# Patient Record
Sex: FEMALE | Race: WHITE | URBAN - METROPOLITAN AREA
[De-identification: names, ages, dates, MRNs, and addresses within clinical notes are randomized per-mention and may not be internally consistent; named-entity substitution may affect disease eponyms.]

---

## 2020-07-23 ENCOUNTER — WEB ENCOUNTER (OUTPATIENT)
Dept: URBAN - METROPOLITAN AREA CLINIC 54 | Facility: CLINIC | Age: 65
End: 2020-07-23

## 2020-07-23 ENCOUNTER — OFFICE VISIT (OUTPATIENT)
Dept: URBAN - METROPOLITAN AREA CLINIC 54 | Facility: CLINIC | Age: 65
End: 2020-07-23
Payer: COMMERCIAL

## 2020-07-23 DIAGNOSIS — I86.8 CAPUT MEDUSAE: ICD-10-CM

## 2020-07-23 DIAGNOSIS — R19.4 CHANGE IN BOWEL FUNCTION: ICD-10-CM

## 2020-07-23 DIAGNOSIS — K63.5 COLON POLYPS: ICD-10-CM

## 2020-07-23 DIAGNOSIS — R19.5 MUCOUS IN STOOLS: ICD-10-CM

## 2020-07-23 DIAGNOSIS — K57.92 DIVERTICULITIS: ICD-10-CM

## 2020-07-23 DIAGNOSIS — J44.9 COPD (CHRONIC OBSTRUCTIVE PULMONARY DISEASE): ICD-10-CM

## 2020-07-23 PROCEDURE — 3017F COLORECTAL CA SCREEN DOC REV: CPT | Performed by: INTERNAL MEDICINE

## 2020-07-23 PROCEDURE — G9903 PT SCRN TBCO ID AS NON USER: HCPCS | Performed by: INTERNAL MEDICINE

## 2020-07-23 PROCEDURE — 1036F TOBACCO NON-USER: CPT | Performed by: INTERNAL MEDICINE

## 2020-07-23 PROCEDURE — 99214 OFFICE O/P EST MOD 30 MIN: CPT | Performed by: INTERNAL MEDICINE

## 2020-07-23 PROCEDURE — G8427 DOCREV CUR MEDS BY ELIG CLIN: HCPCS | Performed by: INTERNAL MEDICINE

## 2020-07-23 RX ORDER — MONTELUKAST SODIUM 10 MG/1
1 TABLET TABLET ORAL ONCE A DAY
Status: ACTIVE | COMMUNITY

## 2020-07-23 RX ORDER — FLUTICASONE PROPIONATE AND SALMETEROL XINAFOATE 230; 21 UG/1; UG/1
INHALE 2 PUFFS BY INHALATION ROUTE 2 TIMES PER DAY IN THE MORNING AND EVENING AEROSOL, METERED RESPIRATORY (INHALATION) 2
Qty: 1 | Refills: 0 | Status: ACTIVE | COMMUNITY
Start: 1900-01-01

## 2020-07-23 RX ORDER — TORSEMIDE 10 MG/1
1 TABLET TABLET ORAL ONCE A DAY
Status: ACTIVE | COMMUNITY

## 2020-07-23 RX ORDER — PREDNISONE 10 MG/1
TAKE 1 MG/KG BY ORAL ROUTE ONCE DAILY TABLET ORAL 1
Qty: 0 | Refills: 0 | Status: ACTIVE | COMMUNITY
Start: 1900-01-01

## 2020-07-23 RX ORDER — VARENICLINE TARTRATE 1 MG/1
AS DIRECTED TABLET, FILM COATED ORAL
Status: ACTIVE | COMMUNITY

## 2020-07-23 RX ORDER — SODIUM, POTASSIUM,MAG SULFATES 17.5-3.13G
177 ML SOLUTION, RECONSTITUTED, ORAL ORAL
Qty: 1 | Refills: 0 | OUTPATIENT
Start: 2020-07-23 | End: 2020-07-24

## 2020-07-23 RX ORDER — LEVOCETIRIZINE DIHYDROCHLORIDE 5 MG/1
TAKE 1 TABLET (5 MG) BY ORAL ROUTE ONCE DAILY IN THE EVENING TABLET ORAL 1
Qty: 0 | Refills: 0 | Status: DISCONTINUED | COMMUNITY
Start: 1900-01-01

## 2020-07-23 RX ORDER — SERTRALINE 100 MG/1
TAKE 1.5 TABLETS (150 MG) BY ORAL ROUTE ONCE DAILY TABLET, FILM COATED ORAL 1
Qty: 0 | Refills: 0 | Status: ACTIVE | COMMUNITY
Start: 1900-01-01

## 2020-07-23 RX ORDER — PANTOPRAZOLE SODIUM 40 MG/1
1 TABLET TABLET, DELAYED RELEASE ORAL ONCE A DAY
Status: ACTIVE | COMMUNITY

## 2020-07-23 RX ORDER — MULTIVIT-MIN/IRON/FOLIC ACID/K 18-600-40
AS DIRECTED CAPSULE ORAL
Status: ACTIVE | COMMUNITY

## 2020-07-23 RX ORDER — AZITHROMYCIN 250 MG/1
M, W, F TABLET, FILM COATED ORAL
Qty: 0 | Refills: 0 | Status: ACTIVE | COMMUNITY
Start: 1900-01-01

## 2020-07-23 RX ORDER — TIOTROPIUM BROMIDE 18 UG/1
INHALE 1 CAPSULE (18 MCG) USING 2 INHALATIONS BY INHALATION ROUTE ONCE DAILY VIA HANDIHALER CAPSULE ORAL; RESPIRATORY (INHALATION) 1
Qty: 1 | Refills: 0 | Status: ACTIVE | COMMUNITY
Start: 1900-01-01

## 2020-07-23 RX ORDER — ROFLUMILAST 500 UG/1
1 TABLET TABLET ORAL ONCE A DAY
Status: ACTIVE | COMMUNITY

## 2020-07-23 RX ORDER — IBUPROFEN 200 MG
1 TABLET WITH MEALS CAPSULE ORAL TWICE A DAY
Status: ACTIVE | COMMUNITY

## 2020-07-23 RX ORDER — LEVOCETIRIZINE DIHYDROCHLORIDE 5 MG/1
1 TABLET IN THE EVENING TABLET, FILM COATED ORAL ONCE A DAY
Status: ACTIVE | COMMUNITY

## 2020-07-23 RX ORDER — ALPRAZOLAM 1 MG
TAKE 1 TABLET (1 MG) BY ORAL ROUTE 3 TIMES PER DAY TABLET ORAL
Qty: 0 | Refills: 0 | Status: ACTIVE | COMMUNITY
Start: 1900-01-01

## 2020-07-23 NOTE — PHYSICAL EXAM GASTROINTESTINAL
Abdomen , soft, distended , no guarding or rigidity , no masses palpable , normal bowel sounds , Liver and Spleen , no hepatomegaly present , no hepatosplenomegaly , liver nontender , spleen not palpable +caput and striae

## 2020-07-23 NOTE — HPI-TODAY'S VISIT:
Patient reports that she has been having trouble with changes inbowel habits alternating bowel movement. Pencil like stools at times Feeling of incomplete emptying  Pt with increased abdominal girth as well with asymmetry on the rt side. Recent dx of bladder ca.

## 2020-07-23 NOTE — PHYSICAL EXAM NECK/THYROID:
normal appearance , without tenderness upon palpation , no deformities , trachea midline , Thyroid normal size , no thyroid nodules , no masses , no JVD , thyroid nontender Yes

## 2020-07-23 NOTE — PHYSICAL EXAM CONSTITUTIONAL:
well developed, well nourished , in no acute distress , ambulating without difficulty , normal communication ability obese wearing oxygen

## 2020-07-24 ENCOUNTER — TELEPHONE ENCOUNTER (OUTPATIENT)
Dept: URBAN - METROPOLITAN AREA CLINIC 54 | Facility: CLINIC | Age: 65
End: 2020-07-24

## 2020-07-24 RX ORDER — SODIUM, POTASSIUM,MAG SULFATES 17.5-3.13G
177 ML SOLUTION, RECONSTITUTED, ORAL ORAL
Qty: 1 | Refills: 0 | Status: ACTIVE | COMMUNITY
Start: 2020-07-23 | End: 2020-07-24

## 2020-07-24 RX ORDER — TORSEMIDE 10 MG/1
1 TABLET TABLET ORAL ONCE A DAY
Status: ACTIVE | COMMUNITY

## 2020-07-24 RX ORDER — FLUTICASONE PROPIONATE AND SALMETEROL XINAFOATE 230; 21 UG/1; UG/1
INHALE 2 PUFFS BY INHALATION ROUTE 2 TIMES PER DAY IN THE MORNING AND EVENING AEROSOL, METERED RESPIRATORY (INHALATION) 2
Qty: 1 | Refills: 0 | Status: ACTIVE | COMMUNITY
Start: 1900-01-01

## 2020-07-24 RX ORDER — SERTRALINE 100 MG/1
TAKE 1.5 TABLETS (150 MG) BY ORAL ROUTE ONCE DAILY TABLET, FILM COATED ORAL 1
Qty: 0 | Refills: 0 | Status: ACTIVE | COMMUNITY
Start: 1900-01-01

## 2020-07-24 RX ORDER — PREDNISONE 10 MG/1
TAKE 1 MG/KG BY ORAL ROUTE ONCE DAILY TABLET ORAL 1
Qty: 0 | Refills: 0 | Status: ACTIVE | COMMUNITY
Start: 1900-01-01

## 2020-07-24 RX ORDER — ALPRAZOLAM 1 MG
TAKE 1 TABLET (1 MG) BY ORAL ROUTE 3 TIMES PER DAY TABLET ORAL
Qty: 0 | Refills: 0 | Status: ACTIVE | COMMUNITY
Start: 1900-01-01

## 2020-07-24 RX ORDER — TIOTROPIUM BROMIDE 18 UG/1
INHALE 1 CAPSULE (18 MCG) USING 2 INHALATIONS BY INHALATION ROUTE ONCE DAILY VIA HANDIHALER CAPSULE ORAL; RESPIRATORY (INHALATION) 1
Qty: 1 | Refills: 0 | Status: ACTIVE | COMMUNITY
Start: 1900-01-01

## 2020-07-24 RX ORDER — MULTIVIT-MIN/IRON/FOLIC ACID/K 18-600-40
AS DIRECTED CAPSULE ORAL
Status: ACTIVE | COMMUNITY

## 2020-07-24 RX ORDER — VARENICLINE TARTRATE 1 MG/1
AS DIRECTED TABLET, FILM COATED ORAL
Status: ACTIVE | COMMUNITY

## 2020-07-24 RX ORDER — IBUPROFEN 200 MG
1 TABLET WITH MEALS CAPSULE ORAL TWICE A DAY
Status: ACTIVE | COMMUNITY

## 2020-07-24 RX ORDER — ONDANSETRON HYDROCHLORIDE 8 MG/1
1 TABLET AS NEEDED TABLET, FILM COATED ORAL ONCE A DAY
Qty: 30 TABLET | Refills: 2 | OUTPATIENT
Start: 2020-07-24

## 2020-07-24 RX ORDER — AZITHROMYCIN 250 MG/1
M, W, F TABLET, FILM COATED ORAL
Qty: 0 | Refills: 0 | Status: ACTIVE | COMMUNITY
Start: 1900-01-01

## 2020-07-24 RX ORDER — LEVOCETIRIZINE DIHYDROCHLORIDE 5 MG/1
1 TABLET IN THE EVENING TABLET, FILM COATED ORAL ONCE A DAY
Status: ACTIVE | COMMUNITY

## 2020-07-24 RX ORDER — ROFLUMILAST 500 UG/1
1 TABLET TABLET ORAL ONCE A DAY
Status: ACTIVE | COMMUNITY

## 2020-07-24 RX ORDER — MONTELUKAST SODIUM 10 MG/1
1 TABLET TABLET ORAL ONCE A DAY
Status: ACTIVE | COMMUNITY

## 2020-07-24 RX ORDER — PANTOPRAZOLE SODIUM 40 MG/1
1 TABLET TABLET, DELAYED RELEASE ORAL ONCE A DAY
Status: ACTIVE | COMMUNITY

## 2020-07-27 ENCOUNTER — OFFICE VISIT (OUTPATIENT)
Dept: URBAN - METROPOLITAN AREA MEDICAL CENTER 23 | Facility: MEDICAL CENTER | Age: 65
End: 2020-07-27
Payer: COMMERCIAL

## 2020-07-27 DIAGNOSIS — K63.89 BACTERIAL OVERGROWTH SYNDROME: ICD-10-CM

## 2020-07-27 DIAGNOSIS — D12.4 ADENOMATOUS POLYP OF DESCENDING COLON: ICD-10-CM

## 2020-07-27 DIAGNOSIS — D12.3 ADENOMA OF TRANSVERSE COLON: ICD-10-CM

## 2020-07-27 DIAGNOSIS — D12.5 ADENOMA OF SIGMOID COLON: ICD-10-CM

## 2020-07-27 DIAGNOSIS — D12.0 BENIGN NEOPLASM OF CECUM: ICD-10-CM

## 2020-07-27 DIAGNOSIS — R19.4 ALTERED BOWEL HABITS: ICD-10-CM

## 2020-07-27 PROCEDURE — 45385 COLONOSCOPY W/LESION REMOVAL: CPT | Performed by: INTERNAL MEDICINE

## 2020-07-27 PROCEDURE — 45380 COLONOSCOPY AND BIOPSY: CPT | Performed by: INTERNAL MEDICINE

## 2020-07-27 PROCEDURE — G9937 DIG OR SURV COLSCO: HCPCS | Performed by: INTERNAL MEDICINE

## 2020-07-30 LAB
C DIFFICILE TOXIN GENE NAA: POSITIVE
CAMPYLOBACTER CULTURE: (no result)
E COLI SHIGA TOXIN EIA: NEGATIVE
GIARDIA LAMBLIA AG, EIA: NEGATIVE
Lab: (no result)
OVA + PARASITE EXAM: (no result)
SALMONELLA/SHIGELLA SCREEN: (no result)

## 2020-08-01 ENCOUNTER — TELEPHONE ENCOUNTER (OUTPATIENT)
Dept: URBAN - METROPOLITAN AREA CLINIC 92 | Facility: CLINIC | Age: 65
End: 2020-08-01

## 2020-08-01 RX ORDER — ROFLUMILAST 500 UG/1
1 TABLET TABLET ORAL ONCE A DAY
Status: ACTIVE | COMMUNITY

## 2020-08-01 RX ORDER — PREDNISONE 10 MG/1
TAKE 1 MG/KG BY ORAL ROUTE ONCE DAILY TABLET ORAL 1
Qty: 0 | Refills: 0 | Status: ACTIVE | COMMUNITY
Start: 1900-01-01

## 2020-08-01 RX ORDER — MONTELUKAST SODIUM 10 MG/1
1 TABLET TABLET ORAL ONCE A DAY
Status: ACTIVE | COMMUNITY

## 2020-08-01 RX ORDER — VARENICLINE TARTRATE 1 MG/1
AS DIRECTED TABLET, FILM COATED ORAL
Status: ACTIVE | COMMUNITY

## 2020-08-01 RX ORDER — IBUPROFEN 200 MG
1 TABLET WITH MEALS CAPSULE ORAL TWICE A DAY
Status: ACTIVE | COMMUNITY

## 2020-08-01 RX ORDER — AZITHROMYCIN 250 MG/1
M, W, F TABLET, FILM COATED ORAL
Qty: 0 | Refills: 0 | Status: ACTIVE | COMMUNITY
Start: 1900-01-01

## 2020-08-01 RX ORDER — PANTOPRAZOLE SODIUM 40 MG/1
1 TABLET TABLET, DELAYED RELEASE ORAL ONCE A DAY
Status: ACTIVE | COMMUNITY

## 2020-08-01 RX ORDER — MULTIVIT-MIN/IRON/FOLIC ACID/K 18-600-40
AS DIRECTED CAPSULE ORAL
Status: ACTIVE | COMMUNITY

## 2020-08-01 RX ORDER — TORSEMIDE 10 MG/1
1 TABLET TABLET ORAL ONCE A DAY
Status: ACTIVE | COMMUNITY

## 2020-08-01 RX ORDER — TIOTROPIUM BROMIDE 18 UG/1
INHALE 1 CAPSULE (18 MCG) USING 2 INHALATIONS BY INHALATION ROUTE ONCE DAILY VIA HANDIHALER CAPSULE ORAL; RESPIRATORY (INHALATION) 1
Qty: 1 | Refills: 0 | Status: ACTIVE | COMMUNITY
Start: 1900-01-01

## 2020-08-01 RX ORDER — FIDAXOMICIN 200 MG/1
1 TABLET TABLET, FILM COATED ORAL TWICE A DAY
Qty: 20 | OUTPATIENT
Start: 2020-08-01 | End: 2020-08-11

## 2020-08-01 RX ORDER — SODIUM, POTASSIUM,MAG SULFATES 17.5-3.13G
177 ML SOLUTION, RECONSTITUTED, ORAL ORAL
Qty: 1 | Refills: 0 | Status: ACTIVE | COMMUNITY
Start: 2020-07-23 | End: 2020-07-24

## 2020-08-01 RX ORDER — SERTRALINE 100 MG/1
TAKE 1.5 TABLETS (150 MG) BY ORAL ROUTE ONCE DAILY TABLET, FILM COATED ORAL 1
Qty: 0 | Refills: 0 | Status: ACTIVE | COMMUNITY
Start: 1900-01-01

## 2020-08-01 RX ORDER — FLUTICASONE PROPIONATE AND SALMETEROL XINAFOATE 230; 21 UG/1; UG/1
INHALE 2 PUFFS BY INHALATION ROUTE 2 TIMES PER DAY IN THE MORNING AND EVENING AEROSOL, METERED RESPIRATORY (INHALATION) 2
Qty: 1 | Refills: 0 | Status: ACTIVE | COMMUNITY
Start: 1900-01-01

## 2020-08-01 RX ORDER — LEVOCETIRIZINE DIHYDROCHLORIDE 5 MG/1
1 TABLET IN THE EVENING TABLET, FILM COATED ORAL ONCE A DAY
Status: ACTIVE | COMMUNITY

## 2020-08-01 RX ORDER — ALPRAZOLAM 1 MG
TAKE 1 TABLET (1 MG) BY ORAL ROUTE 3 TIMES PER DAY TABLET ORAL
Qty: 0 | Refills: 0 | Status: ACTIVE | COMMUNITY
Start: 1900-01-01

## 2020-08-05 ENCOUNTER — TELEPHONE ENCOUNTER (OUTPATIENT)
Dept: URBAN - METROPOLITAN AREA CLINIC 54 | Facility: CLINIC | Age: 65
End: 2020-08-05

## 2020-08-14 ENCOUNTER — TELEPHONE ENCOUNTER (OUTPATIENT)
Dept: URBAN - METROPOLITAN AREA CLINIC 54 | Facility: CLINIC | Age: 65
End: 2020-08-14

## 2020-08-26 ENCOUNTER — TELEPHONE ENCOUNTER (OUTPATIENT)
Dept: URBAN - METROPOLITAN AREA CLINIC 54 | Facility: CLINIC | Age: 65
End: 2020-08-26

## 2020-08-26 RX ORDER — METRONIDAZOLE 500 MG/1
1 TABLET TABLET ORAL THREE TIMES A DAY
Qty: 30 | OUTPATIENT
Start: 2020-08-26 | End: 2020-09-05

## 2020-10-26 ENCOUNTER — OFFICE VISIT (OUTPATIENT)
Dept: URBAN - METROPOLITAN AREA CLINIC 54 | Facility: CLINIC | Age: 65
End: 2020-10-26

## 2020-11-16 ENCOUNTER — OFFICE VISIT (OUTPATIENT)
Dept: URBAN - METROPOLITAN AREA CLINIC 54 | Facility: CLINIC | Age: 65
End: 2020-11-16
Payer: COMMERCIAL

## 2020-11-16 ENCOUNTER — WEB ENCOUNTER (OUTPATIENT)
Dept: URBAN - METROPOLITAN AREA CLINIC 54 | Facility: CLINIC | Age: 65
End: 2020-11-16

## 2020-11-16 DIAGNOSIS — K57.90 DIVERTICULOSIS: ICD-10-CM

## 2020-11-16 DIAGNOSIS — A04.72 C. DIFFICILE COLITIS: ICD-10-CM

## 2020-11-16 DIAGNOSIS — K63.5 COLON POLYPS: ICD-10-CM

## 2020-11-16 PROCEDURE — G8417 CALC BMI ABV UP PARAM F/U: HCPCS | Performed by: INTERNAL MEDICINE

## 2020-11-16 PROCEDURE — G9903 PT SCRN TBCO ID AS NON USER: HCPCS | Performed by: INTERNAL MEDICINE

## 2020-11-16 PROCEDURE — G8482 FLU IMMUNIZE ORDER/ADMIN: HCPCS | Performed by: INTERNAL MEDICINE

## 2020-11-16 PROCEDURE — 1036F TOBACCO NON-USER: CPT | Performed by: INTERNAL MEDICINE

## 2020-11-16 PROCEDURE — 87493 C DIFF AMPLIFIED PROBE: CPT | Performed by: INTERNAL MEDICINE

## 2020-11-16 PROCEDURE — 99214 OFFICE O/P EST MOD 30 MIN: CPT | Performed by: INTERNAL MEDICINE

## 2020-11-16 RX ORDER — METRONIDAZOLE 500 MG/1
1 TABLET TABLET ORAL TWICE A DAY
Qty: 20 | OUTPATIENT
Start: 2020-11-16 | End: 2020-11-26

## 2020-11-16 RX ORDER — IBUPROFEN 200 MG
1 TABLET WITH MEALS CAPSULE ORAL TWICE A DAY
Status: ACTIVE | COMMUNITY

## 2020-11-16 RX ORDER — MULTIVIT-MIN/IRON/FOLIC ACID/K 18-600-40
AS DIRECTED CAPSULE ORAL
Status: ACTIVE | COMMUNITY

## 2020-11-16 RX ORDER — PREDNISONE 10 MG/1
TAKE 1 MG/KG BY ORAL ROUTE ONCE DAILY TABLET ORAL 1
Qty: 0 | Refills: 0 | Status: ACTIVE | COMMUNITY
Start: 1900-01-01

## 2020-11-16 RX ORDER — AZITHROMYCIN 250 MG/1
M, W, F TABLET, FILM COATED ORAL
Qty: 0 | Refills: 0 | Status: ACTIVE | COMMUNITY
Start: 1900-01-01

## 2020-11-16 RX ORDER — FLUTICASONE PROPIONATE AND SALMETEROL XINAFOATE 230; 21 UG/1; UG/1
INHALE 2 PUFFS BY INHALATION ROUTE 2 TIMES PER DAY IN THE MORNING AND EVENING AEROSOL, METERED RESPIRATORY (INHALATION) 2
Qty: 1 | Refills: 0 | Status: ACTIVE | COMMUNITY
Start: 1900-01-01

## 2020-11-16 RX ORDER — PANTOPRAZOLE SODIUM 40 MG/1
1 TABLET TABLET, DELAYED RELEASE ORAL ONCE A DAY
Status: ACTIVE | COMMUNITY

## 2020-11-16 RX ORDER — TORSEMIDE 10 MG/1
1 TABLET TABLET ORAL ONCE A DAY
Status: ACTIVE | COMMUNITY

## 2020-11-16 RX ORDER — ROFLUMILAST 500 UG/1
1 TABLET TABLET ORAL ONCE A DAY
Status: ACTIVE | COMMUNITY

## 2020-11-16 RX ORDER — MONTELUKAST SODIUM 10 MG/1
1 TABLET TABLET ORAL ONCE A DAY
Status: ACTIVE | COMMUNITY

## 2020-11-16 RX ORDER — ONDANSETRON HYDROCHLORIDE 8 MG/1
1 TABLET AS NEEDED TABLET, FILM COATED ORAL ONCE A DAY
Qty: 30 TABLET | Refills: 2 | Status: ACTIVE | COMMUNITY
Start: 2020-07-24

## 2020-11-16 RX ORDER — TIOTROPIUM BROMIDE 18 UG/1
INHALE 1 CAPSULE (18 MCG) USING 2 INHALATIONS BY INHALATION ROUTE ONCE DAILY VIA HANDIHALER CAPSULE ORAL; RESPIRATORY (INHALATION) 1
Qty: 1 | Refills: 0 | Status: ACTIVE | COMMUNITY
Start: 1900-01-01

## 2020-11-16 RX ORDER — ALPRAZOLAM 1 MG
TAKE 1 TABLET (1 MG) BY ORAL ROUTE 3 TIMES PER DAY TABLET ORAL
Qty: 0 | Refills: 0 | Status: ACTIVE | COMMUNITY
Start: 1900-01-01

## 2020-11-16 RX ORDER — LEVOCETIRIZINE DIHYDROCHLORIDE 5 MG/1
1 TABLET IN THE EVENING TABLET, FILM COATED ORAL ONCE A DAY
Status: ACTIVE | COMMUNITY

## 2020-11-16 RX ORDER — VARENICLINE TARTRATE 1 MG/1
AS DIRECTED TABLET, FILM COATED ORAL
Status: ACTIVE | COMMUNITY

## 2020-11-16 RX ORDER — DICYCLOMINE HYDROCHLORIDE 20 MG/1
TAKE 1 TABLET BY MOUTH THREE TIMES DAILY AS NEEDED FOR 30 DAYS TABLET ORAL THREE TIMES A DAY
Qty: 90 | Refills: 2 | OUTPATIENT
Start: 2020-11-16 | End: 2021-08-13

## 2020-11-16 RX ORDER — SERTRALINE 100 MG/1
TAKE 1.5 TABLETS (150 MG) BY ORAL ROUTE ONCE DAILY TABLET, FILM COATED ORAL 1
Qty: 0 | Refills: 0 | Status: ACTIVE | COMMUNITY
Start: 1900-01-01

## 2020-11-16 NOTE — HPI-TODAY'S VISIT:
Pt report that she continues to have shoulder pain and had C dif in the past. Pt report that she has had stabbing pains in the llq. She feels like she has chronic diverticulitis. She was intolerant of vanc and took dificid which she doesnt think worked. She is currently on macrobid for a UTI.  Pt reports that she continues to have problem.  Pt reports that she is not allergic to flagyl. she has tolerated this in the past.

## 2020-12-30 ENCOUNTER — TELEPHONE ENCOUNTER (OUTPATIENT)
Dept: URBAN - METROPOLITAN AREA CLINIC 54 | Facility: CLINIC | Age: 65
End: 2020-12-30

## 2020-12-30 RX ORDER — ONDANSETRON HYDROCHLORIDE 8 MG/1
1 TABLET AS NEEDED TABLET, FILM COATED ORAL ONCE A DAY
Qty: 30 TABLET | Refills: 2 | OUTPATIENT
Start: 2020-12-30

## 2021-02-23 ENCOUNTER — OFFICE VISIT (OUTPATIENT)
Dept: URBAN - NONMETROPOLITAN AREA CLINIC 4 | Facility: CLINIC | Age: 66
End: 2021-02-23
Payer: COMMERCIAL

## 2021-02-23 DIAGNOSIS — R19.5 MUCOUS IN STOOLS: ICD-10-CM

## 2021-02-23 DIAGNOSIS — K63.5 COLON POLYPS: ICD-10-CM

## 2021-02-23 DIAGNOSIS — R11.0 NAUSEA: ICD-10-CM

## 2021-02-23 DIAGNOSIS — K58.9 IRRITABLE BOWEL SYNDROME WITHOUT DIARRHEA: ICD-10-CM

## 2021-02-23 PROBLEM — 10743008: Status: ACTIVE | Noted: 2021-02-23

## 2021-02-23 PROCEDURE — 99214 OFFICE O/P EST MOD 30 MIN: CPT | Performed by: INTERNAL MEDICINE

## 2021-02-23 RX ORDER — ONDANSETRON HYDROCHLORIDE 8 MG/1
1 TABLET AS NEEDED TABLET, FILM COATED ORAL ONCE A DAY
Qty: 30 TABLET | Refills: 2 | Status: ACTIVE | COMMUNITY

## 2021-02-23 RX ORDER — DICYCLOMINE HYDROCHLORIDE 20 MG/1
TAKE 1 TABLET BY MOUTH THREE TIMES DAILY AS NEEDED FOR 30 DAYS TABLET ORAL THREE TIMES A DAY
Qty: 90 | Refills: 2 | Status: ACTIVE | COMMUNITY

## 2021-02-23 RX ORDER — LEVOCETIRIZINE DIHYDROCHLORIDE 5 MG/1
1 TABLET IN THE EVENING TABLET, FILM COATED ORAL ONCE A DAY
Status: ACTIVE | COMMUNITY

## 2021-02-23 RX ORDER — PREDNISONE 10 MG/1
TAKE 1 MG/KG BY ORAL ROUTE ONCE DAILY TABLET ORAL 1
Qty: 0 | Refills: 0 | Status: ACTIVE | COMMUNITY

## 2021-02-23 RX ORDER — MULTIVIT-MIN/IRON/FOLIC ACID/K 18-600-40
AS DIRECTED CAPSULE ORAL
Status: ACTIVE | COMMUNITY

## 2021-02-23 RX ORDER — SERTRALINE 100 MG/1
TAKE 1.5 TABLETS (150 MG) BY ORAL ROUTE ONCE DAILY TABLET, FILM COATED ORAL 1
Qty: 0 | Refills: 0 | Status: ACTIVE | COMMUNITY

## 2021-02-23 RX ORDER — IBUPROFEN 200 MG
1 TABLET WITH MEALS CAPSULE ORAL TWICE A DAY
Status: ACTIVE | COMMUNITY

## 2021-02-23 RX ORDER — MONTELUKAST SODIUM 10 MG/1
1 TABLET TABLET ORAL ONCE A DAY
Status: ACTIVE | COMMUNITY

## 2021-02-23 RX ORDER — VARENICLINE TARTRATE 1 MG/1
AS DIRECTED TABLET, FILM COATED ORAL
Status: ACTIVE | COMMUNITY

## 2021-02-23 RX ORDER — TORSEMIDE 10 MG/1
1 TABLET TABLET ORAL ONCE A DAY
Status: ACTIVE | COMMUNITY

## 2021-02-23 RX ORDER — PANTOPRAZOLE SODIUM 40 MG/1
1 TABLET TABLET, DELAYED RELEASE ORAL ONCE A DAY
Status: ACTIVE | COMMUNITY

## 2021-02-23 RX ORDER — AZITHROMYCIN 250 MG/1
M, W, F TABLET, FILM COATED ORAL
Qty: 0 | Refills: 0 | Status: ACTIVE | COMMUNITY

## 2021-02-23 RX ORDER — PROMETHAZINE HYDROCHLORIDE 12.5 MG/1
1 TABLET AS NEEDED TABLET ORAL EVERY 24 HOURS
Qty: 30 CAPSULE | Refills: 6 | OUTPATIENT
End: 2021-09-21

## 2021-02-23 RX ORDER — TIOTROPIUM BROMIDE 18 UG/1
INHALE 1 CAPSULE (18 MCG) USING 2 INHALATIONS BY INHALATION ROUTE ONCE DAILY VIA HANDIHALER CAPSULE ORAL; RESPIRATORY (INHALATION) 1
Qty: 1 | Refills: 0 | Status: ACTIVE | COMMUNITY

## 2021-02-23 RX ORDER — FLUTICASONE PROPIONATE AND SALMETEROL XINAFOATE 230; 21 UG/1; UG/1
INHALE 2 PUFFS BY INHALATION ROUTE 2 TIMES PER DAY IN THE MORNING AND EVENING AEROSOL, METERED RESPIRATORY (INHALATION) 2
Qty: 1 | Refills: 0 | Status: ACTIVE | COMMUNITY

## 2021-02-23 RX ORDER — ALPRAZOLAM 1 MG
TAKE 1 TABLET (1 MG) BY ORAL ROUTE 3 TIMES PER DAY TABLET ORAL
Qty: 0 | Refills: 0 | Status: ACTIVE | COMMUNITY

## 2021-02-23 RX ORDER — ROFLUMILAST 500 UG/1
1 TABLET TABLET ORAL ONCE A DAY
Status: ACTIVE | COMMUNITY

## 2021-02-23 NOTE — HPI-OTHER HISTORIES
Pt report sthat she has debilitating nausea. Wakes up in middle of night with dry heaves. Has whitish foam. Pt reports that she has regurgiation of meds.  Pt reports that her poop is grainy and sometimes dark. Sometimes watery stools and sometimes constipated. Pt reports that her stools are subsequently non stop.  Pt rports that she has to almost immediately go to restroom.  Pt reports that she has no oily or greays stools. C scope with multiple polyps.

## 2021-03-02 LAB — C DIFFICILE TOXINS A+B, EIA: NEGATIVE

## 2021-03-23 ENCOUNTER — OFFICE VISIT (OUTPATIENT)
Dept: URBAN - NONMETROPOLITAN AREA CLINIC 4 | Facility: CLINIC | Age: 66
End: 2021-03-23

## 2021-03-23 RX ORDER — MONTELUKAST SODIUM 10 MG/1
1 TABLET TABLET ORAL ONCE A DAY
COMMUNITY

## 2021-03-23 RX ORDER — ALPRAZOLAM 1 MG
TAKE 1 TABLET (1 MG) BY ORAL ROUTE 3 TIMES PER DAY TABLET ORAL
Qty: 0 | Refills: 0 | COMMUNITY

## 2021-03-23 RX ORDER — ONDANSETRON HYDROCHLORIDE 8 MG/1
1 TABLET AS NEEDED TABLET, FILM COATED ORAL ONCE A DAY
Qty: 30 TABLET | Refills: 2 | COMMUNITY

## 2021-03-23 RX ORDER — AZITHROMYCIN 250 MG/1
M, W, F TABLET, FILM COATED ORAL
Qty: 0 | Refills: 0 | COMMUNITY

## 2021-03-23 RX ORDER — PANTOPRAZOLE SODIUM 40 MG/1
1 TABLET TABLET, DELAYED RELEASE ORAL ONCE A DAY
COMMUNITY

## 2021-03-23 RX ORDER — TIOTROPIUM BROMIDE 18 UG/1
INHALE 1 CAPSULE (18 MCG) USING 2 INHALATIONS BY INHALATION ROUTE ONCE DAILY VIA HANDIHALER CAPSULE ORAL; RESPIRATORY (INHALATION) 1
Qty: 1 | Refills: 0 | COMMUNITY

## 2021-03-23 RX ORDER — ROFLUMILAST 500 UG/1
1 TABLET TABLET ORAL ONCE A DAY
COMMUNITY

## 2021-03-23 RX ORDER — DICYCLOMINE HYDROCHLORIDE 20 MG/1
TAKE 1 TABLET BY MOUTH THREE TIMES DAILY AS NEEDED FOR 30 DAYS TABLET ORAL THREE TIMES A DAY
Qty: 90 | Refills: 2 | COMMUNITY

## 2021-03-23 RX ORDER — FLUTICASONE PROPIONATE AND SALMETEROL XINAFOATE 230; 21 UG/1; UG/1
INHALE 2 PUFFS BY INHALATION ROUTE 2 TIMES PER DAY IN THE MORNING AND EVENING AEROSOL, METERED RESPIRATORY (INHALATION) 2
Qty: 1 | Refills: 0 | COMMUNITY

## 2021-03-23 RX ORDER — IBUPROFEN 200 MG
1 TABLET WITH MEALS CAPSULE ORAL TWICE A DAY
COMMUNITY

## 2021-03-23 RX ORDER — MULTIVIT-MIN/IRON/FOLIC ACID/K 18-600-40
AS DIRECTED CAPSULE ORAL
COMMUNITY

## 2021-03-23 RX ORDER — PROMETHAZINE HYDROCHLORIDE 12.5 MG/1
1 TABLET AS NEEDED TABLET ORAL EVERY 24 HOURS
Qty: 30 CAPSULE | Refills: 6 | COMMUNITY

## 2021-03-23 RX ORDER — TORSEMIDE 10 MG/1
1 TABLET TABLET ORAL ONCE A DAY
COMMUNITY

## 2021-03-23 RX ORDER — LEVOCETIRIZINE DIHYDROCHLORIDE 5 MG/1
1 TABLET IN THE EVENING TABLET, FILM COATED ORAL ONCE A DAY
COMMUNITY

## 2021-03-23 RX ORDER — VARENICLINE TARTRATE 1 MG/1
AS DIRECTED TABLET, FILM COATED ORAL
COMMUNITY

## 2021-03-23 RX ORDER — PREDNISONE 10 MG/1
TAKE 1 MG/KG BY ORAL ROUTE ONCE DAILY TABLET ORAL 1
Qty: 0 | Refills: 0 | COMMUNITY

## 2021-03-23 RX ORDER — SERTRALINE 100 MG/1
TAKE 1.5 TABLETS (150 MG) BY ORAL ROUTE ONCE DAILY TABLET, FILM COATED ORAL 1
Qty: 0 | Refills: 0 | COMMUNITY

## 2021-03-29 ENCOUNTER — APPOINTMENT (RX ONLY)
Dept: URBAN - METROPOLITAN AREA OTHER 13 | Facility: OTHER | Age: 66
Setting detail: DERMATOLOGY
End: 2021-03-29

## 2021-03-29 PROBLEM — D48.5 NEOPLASM OF UNCERTAIN BEHAVIOR OF SKIN: Status: ACTIVE | Noted: 2021-03-29

## 2021-03-29 PROCEDURE — 11102 TANGNTL BX SKIN SINGLE LES: CPT

## 2021-03-29 PROCEDURE — ? BIOPSY BY SHAVE METHOD

## 2021-03-29 PROCEDURE — 99204 OFFICE O/P NEW MOD 45 MIN: CPT | Mod: 25

## 2021-03-29 NOTE — PROCEDURE: MIPS QUALITY
Quality 110: Preventive Care And Screening: Influenza Immunization: Influenza Immunization Administered during Influenza season
Quality 47: Advance Care Plan: Advance Care Planning discussed and documented; advance care plan or surrogate decision maker documented in the medical record.
Name And Contact Information For Health Care Proxy: Son, Perfecto Baumann
Quality 111:Pneumonia Vaccination Status For Older Adults: Pneumococcal Vaccination Previously Received
Detail Level: Detailed
Quality 130: Documentation Of Current Medications In The Medical Record: Current Medications Documented

## 2021-03-29 NOTE — PROCEDURE: BIOPSY BY SHAVE METHOD
Detail Level: Detailed
Depth Of Biopsy: dermis
Was A Bandage Applied: Yes
Size Of Lesion In Cm: 0
Biopsy Type: H and E
Biopsy Method: Dermablade
Anesthesia Type: 1% lidocaine with epinephrine
Anesthesia Volume In Cc: 0.5
Hemostasis: Drysol
Wound Care: Petrolatum
Dressing: bandage
Destruction After The Procedure: No
Type Of Destruction Used: Curettage
Curettage Text: The wound bed was treated with curettage after the biopsy was performed.
Cryotherapy Text: The wound bed was treated with cryotherapy after the biopsy was performed.
Electrodesiccation Text: The wound bed was treated with electrodesiccation after the biopsy was performed.
Electrodesiccation And Curettage Text: The wound bed was treated with electrodesiccation and curettage after the biopsy was performed.
Silver Nitrate Text: The wound bed was treated with silver nitrate after the biopsy was performed.
Lab: [id number]
Consent: Written consent was obtained and risks were reviewed including but not limited to scarring, infection, bleeding, scabbing, incomplete removal, nerve damage and allergy to anesthesia.
Post-Care Instructions: I reviewed with the patient in detail post-care instructions. Patient is to keep the biopsy site dry overnight, and then apply bacitracin twice daily until healed. Patient may apply hydrogen peroxide soaks to remove any crusting.
Notification Instructions: Patient will be notified of biopsy results. However, patient instructed to call the office if not contacted within 2 weeks.
Billing Type: Third-Party Bill
Information: Selecting Yes will display possible errors in your note based on the variables you have selected. This validation is only offered as a suggestion for you. PLEASE NOTE THAT THE VALIDATION TEXT WILL BE REMOVED WHEN YOU FINALIZE YOUR NOTE. IF YOU WANT TO FAX A PRELIMINARY NOTE YOU WILL NEED TO TOGGLE THIS TO 'NO' IF YOU DO NOT WANT IT IN YOUR FAXED NOTE.

## 2021-05-03 ENCOUNTER — OFFICE VISIT (OUTPATIENT)
Dept: URBAN - METROPOLITAN AREA CLINIC 54 | Facility: CLINIC | Age: 66
End: 2021-05-03

## 2021-07-08 ENCOUNTER — WEB ENCOUNTER (OUTPATIENT)
Dept: URBAN - NONMETROPOLITAN AREA CLINIC 4 | Facility: CLINIC | Age: 66
End: 2021-07-08

## 2021-07-08 ENCOUNTER — OFFICE VISIT (OUTPATIENT)
Dept: URBAN - NONMETROPOLITAN AREA CLINIC 4 | Facility: CLINIC | Age: 66
End: 2021-07-08
Payer: COMMERCIAL

## 2021-07-08 DIAGNOSIS — K52.9 CHRONIC DIARRHEA: ICD-10-CM

## 2021-07-08 DIAGNOSIS — K63.5 COLON POLYPS: ICD-10-CM

## 2021-07-08 DIAGNOSIS — R19.5 MUCOUS IN STOOLS: ICD-10-CM

## 2021-07-08 PROCEDURE — 99214 OFFICE O/P EST MOD 30 MIN: CPT | Performed by: INTERNAL MEDICINE

## 2021-07-08 RX ORDER — ALPRAZOLAM 1 MG
TAKE 1 TABLET (1 MG) BY ORAL ROUTE 3 TIMES PER DAY TABLET ORAL
Qty: 0 | Refills: 0 | Status: ACTIVE | COMMUNITY

## 2021-07-08 RX ORDER — PROMETHAZINE HYDROCHLORIDE 12.5 MG/1
1 TABLET AS NEEDED TABLET ORAL EVERY 24 HOURS
Qty: 30 CAPSULE | Refills: 6 | Status: ACTIVE | COMMUNITY

## 2021-07-08 RX ORDER — POLYETHYLENE GLYCOL 3350, SODIUM SULFATE, SODIUM CHLORIDE, POTASSIUM CHLORIDE, ASCORBIC ACID, SODIUM ASCORBATE 140-9-5.2G
AS DIRECTED KIT ORAL
Qty: 1 BOX | Refills: 0 | OUTPATIENT
Start: 2021-07-08 | End: 2021-07-09

## 2021-07-08 RX ORDER — MULTIVIT-MIN/IRON/FOLIC ACID/K 18-600-40
AS DIRECTED CAPSULE ORAL
Status: ACTIVE | COMMUNITY

## 2021-07-08 RX ORDER — ONDANSETRON HYDROCHLORIDE 8 MG/1
1 TABLET AS NEEDED TABLET, FILM COATED ORAL ONCE A DAY
Qty: 30 TABLET | Refills: 2 | Status: ACTIVE | COMMUNITY

## 2021-07-08 RX ORDER — LEVOCETIRIZINE DIHYDROCHLORIDE 5 MG/1
1 TABLET IN THE EVENING TABLET, FILM COATED ORAL ONCE A DAY
Status: ACTIVE | COMMUNITY

## 2021-07-08 RX ORDER — TORSEMIDE 10 MG/1
1 TABLET TABLET ORAL ONCE A DAY
Status: ACTIVE | COMMUNITY

## 2021-07-08 RX ORDER — PANTOPRAZOLE SODIUM 40 MG/1
1 TABLET TABLET, DELAYED RELEASE ORAL ONCE A DAY
Status: ACTIVE | COMMUNITY

## 2021-07-08 RX ORDER — TIOTROPIUM BROMIDE 18 UG/1
INHALE 1 CAPSULE (18 MCG) USING 2 INHALATIONS BY INHALATION ROUTE ONCE DAILY VIA HANDIHALER CAPSULE ORAL; RESPIRATORY (INHALATION) 1
Qty: 1 | Refills: 0 | Status: ACTIVE | COMMUNITY

## 2021-07-08 RX ORDER — VARENICLINE TARTRATE 1 MG/1
AS DIRECTED TABLET, FILM COATED ORAL
Status: ACTIVE | COMMUNITY

## 2021-07-08 RX ORDER — SERTRALINE 100 MG/1
TAKE 1.5 TABLETS (150 MG) BY ORAL ROUTE ONCE DAILY TABLET, FILM COATED ORAL 1
Qty: 0 | Refills: 0 | Status: ACTIVE | COMMUNITY

## 2021-07-08 RX ORDER — ROFLUMILAST 500 UG/1
1 TABLET TABLET ORAL ONCE A DAY
Status: ACTIVE | COMMUNITY

## 2021-07-08 RX ORDER — FLUTICASONE PROPIONATE AND SALMETEROL XINAFOATE 230; 21 UG/1; UG/1
INHALE 2 PUFFS BY INHALATION ROUTE 2 TIMES PER DAY IN THE MORNING AND EVENING AEROSOL, METERED RESPIRATORY (INHALATION) 2
Qty: 1 | Refills: 0 | Status: ACTIVE | COMMUNITY

## 2021-07-08 RX ORDER — IBUPROFEN 200 MG
1 TABLET WITH MEALS CAPSULE ORAL TWICE A DAY
Status: ACTIVE | COMMUNITY

## 2021-07-08 RX ORDER — AZITHROMYCIN 250 MG/1
M, W, F TABLET, FILM COATED ORAL
Qty: 0 | Refills: 0 | Status: ACTIVE | COMMUNITY

## 2021-07-08 RX ORDER — MONTELUKAST SODIUM 10 MG/1
1 TABLET TABLET ORAL ONCE A DAY
Status: ACTIVE | COMMUNITY

## 2021-07-08 RX ORDER — PREDNISONE 10 MG/1
TAKE 1 MG/KG BY ORAL ROUTE ONCE DAILY TABLET ORAL 1
Qty: 0 | Refills: 0 | Status: ACTIVE | COMMUNITY

## 2021-07-08 RX ORDER — DICYCLOMINE HYDROCHLORIDE 20 MG/1
TAKE 1 TABLET BY MOUTH THREE TIMES DAILY AS NEEDED FOR 30 DAYS TABLET ORAL THREE TIMES A DAY
Qty: 90 | Refills: 2 | Status: ACTIVE | COMMUNITY

## 2021-07-08 NOTE — HPI-OTHER HISTORIES
Pt report sthat she has debilitating nausea. Wakes up in middle of night with dry heaves. Has whitish foam. Pt reports that she has regurgiation of meds.  Pt reports that her poop is grainy and sometimes dark. Sometimes watery stools and sometimes constipated. Pt reports that her stools are subsequently non stop.  Pt rports that she has to almost immediately go to restroom.  Pt reports that she has no oily or greays stools. C scope with multiple polyps.  7-8-21 Pt reports that she eats and gets a stomach ache. Has mucous in stools. Multiple polyps resectednlast year. Pt reports that she has been experiencing continued bloating. Has hard abdomen. GES and UGI wnl.

## 2021-07-21 LAB
CALPROTECTIN, FECAL: <16
CAMPYLOBACTER CULTURE: (no result)
E COLI SHIGA TOXIN EIA: NEGATIVE
GIARDIA LAMBLIA AG, EIA: NEGATIVE
Lab: (no result)
OVA + PARASITE EXAM: (no result)
OVA + PARASITE EXAM: (no result)
SALMONELLA/SHIGELLA SCREEN: (no result)

## 2021-08-09 ENCOUNTER — TELEPHONE ENCOUNTER (OUTPATIENT)
Dept: URBAN - METROPOLITAN AREA CLINIC 92 | Facility: CLINIC | Age: 66
End: 2021-08-09

## 2021-08-12 ENCOUNTER — CLAIMS CREATED FROM THE CLAIM WINDOW (OUTPATIENT)
Dept: URBAN - METROPOLITAN AREA CLINIC 4 | Facility: CLINIC | Age: 66
End: 2021-08-12
Payer: COMMERCIAL

## 2021-08-12 ENCOUNTER — OFFICE VISIT (OUTPATIENT)
Dept: URBAN - METROPOLITAN AREA SURGERY CENTER 14 | Facility: SURGERY CENTER | Age: 66
End: 2021-08-12
Payer: COMMERCIAL

## 2021-08-12 DIAGNOSIS — D12.3 ADENOMA OF TRANSVERSE COLON: ICD-10-CM

## 2021-08-12 DIAGNOSIS — K63.89 BACTERIAL OVERGROWTH SYNDROME: ICD-10-CM

## 2021-08-12 DIAGNOSIS — K63.5 BENIGN COLON POLYP: ICD-10-CM

## 2021-08-12 DIAGNOSIS — K63.89 COLONIC PSEUDOMELANOSIS: ICD-10-CM

## 2021-08-12 DIAGNOSIS — D12.3 BENIGN NEOPLASM OF TRANSVERSE COLON: ICD-10-CM

## 2021-08-12 DIAGNOSIS — R19.4 ALTERED BOWEL HABITS: ICD-10-CM

## 2021-08-12 PROCEDURE — 45385 COLONOSCOPY W/LESION REMOVAL: CPT | Performed by: INTERNAL MEDICINE

## 2021-08-12 PROCEDURE — 88305 TISSUE EXAM BY PATHOLOGIST: CPT | Performed by: PATHOLOGY

## 2021-08-12 PROCEDURE — 45380 COLONOSCOPY AND BIOPSY: CPT | Performed by: INTERNAL MEDICINE

## 2021-08-12 PROCEDURE — G8907 PT DOC NO EVENTS ON DISCHARG: HCPCS | Performed by: INTERNAL MEDICINE

## 2021-08-12 RX ORDER — MULTIVIT-MIN/IRON/FOLIC ACID/K 18-600-40
AS DIRECTED CAPSULE ORAL
Status: ACTIVE | COMMUNITY

## 2021-08-12 RX ORDER — ONDANSETRON HYDROCHLORIDE 8 MG/1
1 TABLET AS NEEDED TABLET, FILM COATED ORAL ONCE A DAY
Qty: 30 TABLET | Refills: 2 | Status: ACTIVE | COMMUNITY

## 2021-08-12 RX ORDER — TIOTROPIUM BROMIDE 18 UG/1
INHALE 1 CAPSULE (18 MCG) USING 2 INHALATIONS BY INHALATION ROUTE ONCE DAILY VIA HANDIHALER CAPSULE ORAL; RESPIRATORY (INHALATION) 1
Qty: 1 | Refills: 0 | Status: ACTIVE | COMMUNITY

## 2021-08-12 RX ORDER — LEVOCETIRIZINE DIHYDROCHLORIDE 5 MG/1
1 TABLET IN THE EVENING TABLET, FILM COATED ORAL ONCE A DAY
Status: ACTIVE | COMMUNITY

## 2021-08-12 RX ORDER — PANTOPRAZOLE SODIUM 40 MG/1
1 TABLET TABLET, DELAYED RELEASE ORAL ONCE A DAY
Status: ACTIVE | COMMUNITY

## 2021-08-12 RX ORDER — TORSEMIDE 10 MG/1
1 TABLET TABLET ORAL ONCE A DAY
Status: ACTIVE | COMMUNITY

## 2021-08-12 RX ORDER — SERTRALINE 100 MG/1
TAKE 1.5 TABLETS (150 MG) BY ORAL ROUTE ONCE DAILY TABLET, FILM COATED ORAL 1
Qty: 0 | Refills: 0 | Status: ACTIVE | COMMUNITY

## 2021-08-12 RX ORDER — DICYCLOMINE HYDROCHLORIDE 20 MG/1
TAKE 1 TABLET BY MOUTH THREE TIMES DAILY AS NEEDED FOR 30 DAYS TABLET ORAL THREE TIMES A DAY
Qty: 90 | Refills: 2 | Status: ACTIVE | COMMUNITY

## 2021-08-12 RX ORDER — AZITHROMYCIN 250 MG/1
M, W, F TABLET, FILM COATED ORAL
Qty: 0 | Refills: 0 | Status: ACTIVE | COMMUNITY

## 2021-08-12 RX ORDER — PROMETHAZINE HYDROCHLORIDE 12.5 MG/1
1 TABLET AS NEEDED TABLET ORAL EVERY 24 HOURS
Qty: 30 CAPSULE | Refills: 6 | Status: ACTIVE | COMMUNITY

## 2021-08-12 RX ORDER — FLUTICASONE PROPIONATE AND SALMETEROL XINAFOATE 230; 21 UG/1; UG/1
INHALE 2 PUFFS BY INHALATION ROUTE 2 TIMES PER DAY IN THE MORNING AND EVENING AEROSOL, METERED RESPIRATORY (INHALATION) 2
Qty: 1 | Refills: 0 | Status: ACTIVE | COMMUNITY

## 2021-08-12 RX ORDER — IBUPROFEN 200 MG
1 TABLET WITH MEALS CAPSULE ORAL TWICE A DAY
Status: ACTIVE | COMMUNITY

## 2021-08-12 RX ORDER — ROFLUMILAST 500 UG/1
1 TABLET TABLET ORAL ONCE A DAY
Status: ACTIVE | COMMUNITY

## 2021-08-12 RX ORDER — PREDNISONE 10 MG/1
TAKE 1 MG/KG BY ORAL ROUTE ONCE DAILY TABLET ORAL 1
Qty: 0 | Refills: 0 | Status: ACTIVE | COMMUNITY

## 2021-08-12 RX ORDER — MONTELUKAST SODIUM 10 MG/1
1 TABLET TABLET ORAL ONCE A DAY
Status: ACTIVE | COMMUNITY

## 2021-08-12 RX ORDER — VARENICLINE TARTRATE 1 MG/1
AS DIRECTED TABLET, FILM COATED ORAL
Status: ACTIVE | COMMUNITY

## 2021-08-12 RX ORDER — ALPRAZOLAM 1 MG
TAKE 1 TABLET (1 MG) BY ORAL ROUTE 3 TIMES PER DAY TABLET ORAL
Qty: 0 | Refills: 0 | Status: ACTIVE | COMMUNITY

## 2021-08-13 ENCOUNTER — TELEPHONE ENCOUNTER (OUTPATIENT)
Dept: URBAN - NONMETROPOLITAN AREA CLINIC 4 | Facility: CLINIC | Age: 66
End: 2021-08-13

## 2021-08-13 RX ORDER — CIPROFLOXACIN 500 MG/1
1 TABLET TABLET, FILM COATED ORAL
Qty: 20 | OUTPATIENT
Start: 2021-08-16 | End: 2021-08-26

## 2021-08-13 RX ORDER — METRONIDAZOLE 250 MG/1
1 TABLET TABLET ORAL TWICE A DAY
Qty: 20 TABLET | Refills: 0 | OUTPATIENT

## 2021-08-17 ENCOUNTER — TELEPHONE ENCOUNTER (OUTPATIENT)
Dept: URBAN - METROPOLITAN AREA CLINIC 54 | Facility: CLINIC | Age: 66
End: 2021-08-17

## 2022-01-13 ENCOUNTER — OFFICE VISIT (OUTPATIENT)
Dept: URBAN - METROPOLITAN AREA CLINIC 54 | Facility: CLINIC | Age: 67
End: 2022-01-13
Payer: COMMERCIAL

## 2022-01-13 ENCOUNTER — ERX REFILL RESPONSE (OUTPATIENT)
Dept: URBAN - METROPOLITAN AREA CLINIC 54 | Facility: CLINIC | Age: 67
End: 2022-01-13

## 2022-01-13 VITALS
HEIGHT: 64 IN | DIASTOLIC BLOOD PRESSURE: 71 MMHG | SYSTOLIC BLOOD PRESSURE: 120 MMHG | BODY MASS INDEX: 27.14 KG/M2 | WEIGHT: 159 LBS | HEART RATE: 109 BPM | TEMPERATURE: 97.3 F

## 2022-01-13 DIAGNOSIS — K58.0 IRRITABLE BOWEL SYNDROME WITH DIARRHEA: ICD-10-CM

## 2022-01-13 DIAGNOSIS — K57.90 DIVERTICULOSIS: ICD-10-CM

## 2022-01-13 DIAGNOSIS — K57.92 DIVERTICULITIS: ICD-10-CM

## 2022-01-13 DIAGNOSIS — I86.8 CAPUT MEDUSAE: ICD-10-CM

## 2022-01-13 DIAGNOSIS — R14.0 ABDOMINAL BLOATING: ICD-10-CM

## 2022-01-13 PROBLEM — 197125005: Status: ACTIVE | Noted: 2022-01-13

## 2022-01-13 PROBLEM — 398050005 DIVERTICULAR DISEASE OF COLON: Status: ACTIVE | Noted: 2020-11-16

## 2022-01-13 PROCEDURE — 91065 BREATH HYDROGEN/METHANE TEST: CPT

## 2022-01-13 PROCEDURE — 99214 OFFICE O/P EST MOD 30 MIN: CPT

## 2022-01-13 RX ORDER — MONTELUKAST SODIUM 10 MG/1
1 TABLET TABLET ORAL ONCE A DAY
Status: ACTIVE | COMMUNITY

## 2022-01-13 RX ORDER — ONDANSETRON HYDROCHLORIDE 8 MG/1
1 TABLET AS NEEDED TABLET, FILM COATED ORAL ONCE A DAY
Qty: 30 TABLET | Refills: 2 | Status: ACTIVE | COMMUNITY

## 2022-01-13 RX ORDER — TORSEMIDE 10 MG/1
1 TABLET TABLET ORAL ONCE A DAY
Status: ACTIVE | COMMUNITY

## 2022-01-13 RX ORDER — TIOTROPIUM BROMIDE 18 UG/1
INHALE 1 CAPSULE (18 MCG) USING 2 INHALATIONS BY INHALATION ROUTE ONCE DAILY VIA HANDIHALER CAPSULE ORAL; RESPIRATORY (INHALATION) 1
Qty: 1 | Refills: 0 | Status: ACTIVE | COMMUNITY

## 2022-01-13 RX ORDER — ALPRAZOLAM 1 MG
TAKE 1 TABLET (1 MG) BY ORAL ROUTE 3 TIMES PER DAY TABLET ORAL
Qty: 0 | Refills: 0 | Status: ACTIVE | COMMUNITY

## 2022-01-13 RX ORDER — MULTIVIT-MIN/IRON/FOLIC ACID/K 18-600-40
AS DIRECTED CAPSULE ORAL
Status: ACTIVE | COMMUNITY

## 2022-01-13 RX ORDER — VARENICLINE TARTRATE 1 MG/1
AS DIRECTED TABLET, FILM COATED ORAL
Status: DISCONTINUED | COMMUNITY

## 2022-01-13 RX ORDER — METRONIDAZOLE 250 MG/1
1 TABLET TABLET ORAL TWICE A DAY
Qty: 20 TABLET | Refills: 0 | Status: DISCONTINUED | COMMUNITY

## 2022-01-13 RX ORDER — AZITHROMYCIN 250 MG/1
M, W, F TABLET, FILM COATED ORAL
Qty: 0 | Refills: 0 | Status: ACTIVE | COMMUNITY

## 2022-01-13 RX ORDER — IBUPROFEN 200 MG
1 TABLET WITH MEALS CAPSULE ORAL TWICE A DAY
Status: ACTIVE | COMMUNITY

## 2022-01-13 RX ORDER — ROFLUMILAST 500 UG/1
1 TABLET TABLET ORAL ONCE A DAY
Status: ACTIVE | COMMUNITY

## 2022-01-13 RX ORDER — CHOLESTYRAMINE 4 G/9G
1 PACKET MIXED WITH WATER OR NON-CARBONATED DRINK POWDER, FOR SUSPENSION ORAL ONCE A DAY
Qty: 30 | OUTPATIENT

## 2022-01-13 RX ORDER — PANTOPRAZOLE SODIUM 40 MG/1
1 TABLET TABLET, DELAYED RELEASE ORAL ONCE A DAY
Status: ACTIVE | COMMUNITY

## 2022-01-13 RX ORDER — FLUTICASONE PROPIONATE AND SALMETEROL XINAFOATE 230; 21 UG/1; UG/1
INHALE 2 PUFFS BY INHALATION ROUTE 2 TIMES PER DAY IN THE MORNING AND EVENING AEROSOL, METERED RESPIRATORY (INHALATION) 2
Qty: 1 | Refills: 0 | Status: ACTIVE | COMMUNITY

## 2022-01-13 RX ORDER — SERTRALINE 100 MG/1
TAKE 1.5 TABLETS (150 MG) BY ORAL ROUTE ONCE DAILY TABLET, FILM COATED ORAL 1
Qty: 0 | Refills: 0 | Status: ACTIVE | COMMUNITY

## 2022-01-13 RX ORDER — CHOLESTYRAMINE 4 G/9G
MIX AND DRINK 1 PACKET BY MOUTH EVERY DAY. MIX WITH WATER OR NON CARBONATED DRINK POWDER, FOR SUSPENSION ORAL
Qty: 90 EACH | Refills: 1 | OUTPATIENT

## 2022-01-13 RX ORDER — PREDNISONE 10 MG/1
TAKE 1 MG/KG BY ORAL ROUTE ONCE DAILY TABLET ORAL 1
Qty: 0 | Refills: 0 | Status: ACTIVE | COMMUNITY

## 2022-01-13 RX ORDER — CHOLESTYRAMINE 4 G/9G
1 PACKET MIXED WITH WATER OR NON-CARBONATED DRINK POWDER, FOR SUSPENSION ORAL ONCE A DAY
Qty: 30 | OUTPATIENT
Start: 2022-01-13

## 2022-01-13 RX ORDER — DICYCLOMINE HYDROCHLORIDE 20 MG/1
TAKE 1 TABLET BY MOUTH THREE TIMES DAILY AS NEEDED FOR 30 DAYS TABLET ORAL THREE TIMES A DAY
Qty: 90 | Refills: 2 | Status: DISCONTINUED | COMMUNITY

## 2022-01-13 RX ORDER — PROMETHAZINE HYDROCHLORIDE 12.5 MG/1
1 TABLET AS NEEDED TABLET ORAL EVERY 24 HOURS
Qty: 30 CAPSULE | Refills: 6 | Status: ACTIVE | COMMUNITY

## 2022-01-13 RX ORDER — LEVOCETIRIZINE DIHYDROCHLORIDE 5 MG/1
1 TABLET IN THE EVENING TABLET, FILM COATED ORAL ONCE A DAY
Status: ACTIVE | COMMUNITY

## 2022-01-13 NOTE — PHYSICAL EXAM CHEST:
no lesions,  no deformities,  no traumatic injuries,  no significant scars are present,  chest wall non-tender,  no masses present, on oxygen, scattered wheezes throughout

## 2022-04-13 ENCOUNTER — OFFICE VISIT (OUTPATIENT)
Dept: URBAN - METROPOLITAN AREA CLINIC 54 | Facility: CLINIC | Age: 67
End: 2022-04-13

## 2022-04-13 RX ORDER — PROMETHAZINE HYDROCHLORIDE 12.5 MG/1
1 TABLET AS NEEDED TABLET ORAL EVERY 24 HOURS
Qty: 30 CAPSULE | Refills: 6 | Status: ACTIVE | COMMUNITY

## 2022-04-13 RX ORDER — MULTIVIT-MIN/IRON/FOLIC ACID/K 18-600-40
AS DIRECTED CAPSULE ORAL
Status: ACTIVE | COMMUNITY

## 2022-04-13 RX ORDER — MONTELUKAST SODIUM 10 MG/1
1 TABLET TABLET ORAL ONCE A DAY
Status: ACTIVE | COMMUNITY

## 2022-04-13 RX ORDER — ROFLUMILAST 500 UG/1
1 TABLET TABLET ORAL ONCE A DAY
Status: ACTIVE | COMMUNITY

## 2022-04-13 RX ORDER — SERTRALINE 100 MG/1
TAKE 1.5 TABLETS (150 MG) BY ORAL ROUTE ONCE DAILY TABLET, FILM COATED ORAL 1
Qty: 0 | Refills: 0 | Status: ACTIVE | COMMUNITY

## 2022-04-13 RX ORDER — TORSEMIDE 10 MG/1
1 TABLET TABLET ORAL ONCE A DAY
Status: ACTIVE | COMMUNITY

## 2022-04-13 RX ORDER — CHOLESTYRAMINE 4 G/9G
MIX AND DRINK 1 PACKET BY MOUTH EVERY DAY. MIX WITH WATER OR NON CARBONATED DRINK POWDER, FOR SUSPENSION ORAL
Qty: 90 EACH | Refills: 1 | Status: ACTIVE | COMMUNITY

## 2022-04-13 RX ORDER — AZITHROMYCIN 250 MG/1
M, W, F TABLET, FILM COATED ORAL
Qty: 0 | Refills: 0 | Status: ACTIVE | COMMUNITY

## 2022-04-13 RX ORDER — ONDANSETRON HYDROCHLORIDE 8 MG/1
1 TABLET AS NEEDED TABLET, FILM COATED ORAL ONCE A DAY
Qty: 30 TABLET | Refills: 2 | Status: ACTIVE | COMMUNITY

## 2022-04-13 RX ORDER — TIOTROPIUM BROMIDE 18 UG/1
INHALE 1 CAPSULE (18 MCG) USING 2 INHALATIONS BY INHALATION ROUTE ONCE DAILY VIA HANDIHALER CAPSULE ORAL; RESPIRATORY (INHALATION) 1
Qty: 1 | Refills: 0 | Status: ACTIVE | COMMUNITY

## 2022-04-13 RX ORDER — FLUTICASONE PROPIONATE AND SALMETEROL XINAFOATE 230; 21 UG/1; UG/1
INHALE 2 PUFFS BY INHALATION ROUTE 2 TIMES PER DAY IN THE MORNING AND EVENING AEROSOL, METERED RESPIRATORY (INHALATION) 2
Qty: 1 | Refills: 0 | Status: ACTIVE | COMMUNITY

## 2022-04-13 RX ORDER — PANTOPRAZOLE SODIUM 40 MG/1
1 TABLET TABLET, DELAYED RELEASE ORAL ONCE A DAY
Status: ACTIVE | COMMUNITY

## 2022-04-13 RX ORDER — LEVOCETIRIZINE DIHYDROCHLORIDE 5 MG/1
1 TABLET IN THE EVENING TABLET, FILM COATED ORAL ONCE A DAY
Status: ACTIVE | COMMUNITY

## 2022-04-13 RX ORDER — PREDNISONE 10 MG/1
TAKE 1 MG/KG BY ORAL ROUTE ONCE DAILY TABLET ORAL 1
Qty: 0 | Refills: 0 | Status: ACTIVE | COMMUNITY

## 2022-04-13 RX ORDER — IBUPROFEN 200 MG
1 TABLET WITH MEALS CAPSULE ORAL TWICE A DAY
Status: ACTIVE | COMMUNITY

## 2022-04-13 RX ORDER — ALPRAZOLAM 1 MG
TAKE 1 TABLET (1 MG) BY ORAL ROUTE 3 TIMES PER DAY TABLET ORAL
Qty: 0 | Refills: 0 | Status: ACTIVE | COMMUNITY

## 2022-05-06 ENCOUNTER — OFFICE VISIT (OUTPATIENT)
Dept: URBAN - METROPOLITAN AREA CLINIC 54 | Facility: CLINIC | Age: 67
End: 2022-05-06

## 2023-02-23 ENCOUNTER — OFFICE VISIT (OUTPATIENT)
Dept: URBAN - METROPOLITAN AREA CLINIC 54 | Facility: CLINIC | Age: 68
End: 2023-02-23
Payer: COMMERCIAL

## 2023-02-23 VITALS
BODY MASS INDEX: 26.5 KG/M2 | SYSTOLIC BLOOD PRESSURE: 106 MMHG | HEART RATE: 77 BPM | WEIGHT: 155.2 LBS | DIASTOLIC BLOOD PRESSURE: 64 MMHG | TEMPERATURE: 97.4 F | HEIGHT: 64 IN

## 2023-02-23 DIAGNOSIS — R14.0 ABDOMINAL BLOATING: ICD-10-CM

## 2023-02-23 DIAGNOSIS — K52.9 CHRONIC DIARRHEA: ICD-10-CM

## 2023-02-23 PROCEDURE — 99213 OFFICE O/P EST LOW 20 MIN: CPT | Performed by: STUDENT IN AN ORGANIZED HEALTH CARE EDUCATION/TRAINING PROGRAM

## 2023-02-23 RX ORDER — PROMETHAZINE HYDROCHLORIDE 12.5 MG/1
1 TABLET AS NEEDED TABLET ORAL EVERY 24 HOURS
Qty: 30 CAPSULE | Refills: 6 | Status: ACTIVE | COMMUNITY

## 2023-02-23 RX ORDER — FLUTICASONE PROPIONATE AND SALMETEROL XINAFOATE 230; 21 UG/1; UG/1
INHALE 2 PUFFS BY INHALATION ROUTE 2 TIMES PER DAY IN THE MORNING AND EVENING AEROSOL, METERED RESPIRATORY (INHALATION) 2
Qty: 1 | Refills: 0 | Status: ACTIVE | COMMUNITY

## 2023-02-23 RX ORDER — TORSEMIDE 10 MG/1
1 TABLET TABLET ORAL ONCE A DAY
Status: ACTIVE | COMMUNITY

## 2023-02-23 RX ORDER — TIOTROPIUM BROMIDE 18 UG/1
INHALE 1 CAPSULE (18 MCG) USING 2 INHALATIONS BY INHALATION ROUTE ONCE DAILY VIA HANDIHALER CAPSULE ORAL; RESPIRATORY (INHALATION) 1
Qty: 1 | Refills: 0 | Status: ACTIVE | COMMUNITY

## 2023-02-23 RX ORDER — ROFLUMILAST 500 UG/1
1 TABLET TABLET ORAL ONCE A DAY
Status: ACTIVE | COMMUNITY

## 2023-02-23 RX ORDER — LEVOCETIRIZINE DIHYDROCHLORIDE 5 MG/1
1 TABLET IN THE EVENING TABLET, FILM COATED ORAL ONCE A DAY
Status: ACTIVE | COMMUNITY

## 2023-02-23 RX ORDER — PREDNISONE 10 MG/1
TAKE 1 MG/KG BY ORAL ROUTE ONCE DAILY TABLET ORAL 1
Qty: 0 | Refills: 0 | Status: ACTIVE | COMMUNITY

## 2023-02-23 RX ORDER — IBUPROFEN 200 MG
1 TABLET WITH MEALS CAPSULE ORAL TWICE A DAY
Status: ACTIVE | COMMUNITY

## 2023-02-23 RX ORDER — ALPRAZOLAM 1 MG
TAKE 1 TABLET (1 MG) BY ORAL ROUTE 3 TIMES PER DAY TABLET ORAL
Qty: 0 | Refills: 0 | Status: ACTIVE | COMMUNITY

## 2023-02-23 RX ORDER — BUPROPION HYDROCHLORIDE 150 MG/1
1 TABLET IN THE MORNING TABLET, EXTENDED RELEASE ORAL ONCE A DAY
Status: ACTIVE | COMMUNITY

## 2023-02-23 RX ORDER — MONTELUKAST SODIUM 10 MG/1
1 TABLET TABLET ORAL ONCE A DAY
Status: ACTIVE | COMMUNITY

## 2023-02-23 RX ORDER — SERTRALINE 100 MG/1
TAKE 1.5 TABLETS (150 MG) BY ORAL ROUTE ONCE DAILY TABLET, FILM COATED ORAL 1
Qty: 0 | Refills: 0 | Status: ACTIVE | COMMUNITY

## 2023-02-23 RX ORDER — AZITHROMYCIN 250 MG/1
M, W, F TABLET, FILM COATED ORAL
Qty: 0 | Refills: 0 | Status: ACTIVE | COMMUNITY

## 2023-02-23 RX ORDER — ICOSAPENT ETHYL 1 G/1
2 CAPSULES WITH MEALS CAPSULE ORAL TWICE A DAY
Status: ACTIVE | COMMUNITY

## 2023-02-23 RX ORDER — ONDANSETRON HYDROCHLORIDE 8 MG/1
1 TABLET AS NEEDED TABLET, FILM COATED ORAL ONCE A DAY
Qty: 30 TABLET | Refills: 2 | Status: ACTIVE | COMMUNITY

## 2023-02-23 RX ORDER — MULTIVIT-MIN/IRON/FOLIC ACID/K 18-600-40
AS DIRECTED CAPSULE ORAL
Status: ACTIVE | COMMUNITY

## 2023-02-23 NOTE — HPI-TODAY'S VISIT:
2/23/2023: In the interim, patient symptoms have not improved.  She has yet to have stool studies, get SIBO breath test or start modified FODMAP diet.  She has not done trial of Questran either.

## 2023-02-23 NOTE — HPI-OTHER HISTORIES
Pt report that she has debilitating nausea. Wakes up in middle of night with dry heaves. Has whitish foam. Pt reports that she has regurgiation of meds.  Pt reports that her poop is grainy and sometimes dark. Sometimes watery stools and sometimes constipated. Pt reports that her stools are subsequently non stop.  Pt rports that she has to almost immediately go to restroom.  Pt reports that she has no oily or greays stools. C scope with multiple polyps.  7-8-21 Pt reports that she eats and gets a stomach ache. Has mucous in stools. Multiple polyps resectednlast year. Pt reports that she has been experiencing continued bloating. Has hard abdomen. GES and UGI wnl.   1/13/22: Pt is here for follow up. Last cscope was 8/2021 and was significant for multiple polyps. Repeat due in 2024. Pt has straining early in the morning followed by diarrhea for the rest of the day. Varying shapes, sizes, and colors. Pain is having lower abdominal pain, distension, and bloating. This is affecting her quality of life. Pt has not tried Creon. No blood in stool, unintentional weight loss, fever.   Colonoscopy 8/24/21: - Perianal skin tags found on perianal exam. - Severe diverticulosis in the sigmoid colon. There was narrowing of the colon in association with the diverticular opening. Erythema was seen in association with the diverticular opening. Biopsied. - Two 1 to 2 mm polyps at the recto-sigmoid colon, removed with a jumbo cold forceps. Resected and retrieved. - Altered vascular and congested mucosa in the entire examined colon. Biopsied. - Two 3 to 5 mm polyps in the sigmoid colon, removed with a cold snare. Complete resection. Partial retrieval. - One 7 mm polyp in the transverse colon, removed with a cold snare. Resected and retrieved. - The examination was otherwise normal - Biopsy: TA and hyperplastic polyps

## 2023-02-23 NOTE — EXAM-PHYSICAL EXAM
Abdomen: Generalized abdominal tenderness. Pulmonary: Breathing with assistance of nasal cannula and supplemental oxygen.

## 2023-03-20 ENCOUNTER — TELEPHONE ENCOUNTER (OUTPATIENT)
Dept: URBAN - METROPOLITAN AREA CLINIC 35 | Facility: CLINIC | Age: 68
End: 2023-03-20

## 2023-03-23 ENCOUNTER — OFFICE VISIT (OUTPATIENT)
Dept: URBAN - METROPOLITAN AREA CLINIC 54 | Facility: CLINIC | Age: 68
End: 2023-03-23

## 2023-04-26 ENCOUNTER — OFFICE VISIT (OUTPATIENT)
Dept: URBAN - METROPOLITAN AREA CLINIC 54 | Facility: CLINIC | Age: 68
End: 2023-04-26

## 2023-05-02 ENCOUNTER — OFFICE VISIT (OUTPATIENT)
Dept: URBAN - METROPOLITAN AREA CLINIC 54 | Facility: CLINIC | Age: 68
End: 2023-05-02
Payer: COMMERCIAL

## 2023-05-02 VITALS
DIASTOLIC BLOOD PRESSURE: 62 MMHG | SYSTOLIC BLOOD PRESSURE: 100 MMHG | HEIGHT: 64 IN | WEIGHT: 155 LBS | TEMPERATURE: 97.7 F | HEART RATE: 96 BPM | BODY MASS INDEX: 26.46 KG/M2

## 2023-05-02 DIAGNOSIS — R19.8 ALTERNATING CONSTIPATION AND DIARRHEA: ICD-10-CM

## 2023-05-02 DIAGNOSIS — R14.0 ABDOMINAL BLOATING: ICD-10-CM

## 2023-05-02 PROCEDURE — 99213 OFFICE O/P EST LOW 20 MIN: CPT | Performed by: STUDENT IN AN ORGANIZED HEALTH CARE EDUCATION/TRAINING PROGRAM

## 2023-05-02 RX ORDER — ICOSAPENT ETHYL 1 G/1
2 CAPSULES WITH MEALS CAPSULE ORAL TWICE A DAY
Status: ACTIVE | COMMUNITY

## 2023-05-02 RX ORDER — LEVOCETIRIZINE DIHYDROCHLORIDE 5 MG/1
1 TABLET IN THE EVENING TABLET, FILM COATED ORAL ONCE A DAY
Status: ACTIVE | COMMUNITY

## 2023-05-02 RX ORDER — MULTIVIT-MIN/IRON/FOLIC ACID/K 18-600-40
AS DIRECTED CAPSULE ORAL
Status: ACTIVE | COMMUNITY

## 2023-05-02 RX ORDER — ONDANSETRON HYDROCHLORIDE 8 MG/1
1 TABLET AS NEEDED TABLET, FILM COATED ORAL ONCE A DAY
Qty: 30 TABLET | Refills: 2 | Status: ACTIVE | COMMUNITY

## 2023-05-02 RX ORDER — TIOTROPIUM BROMIDE 18 UG/1
INHALE 1 CAPSULE (18 MCG) USING 2 INHALATIONS BY INHALATION ROUTE ONCE DAILY VIA HANDIHALER CAPSULE ORAL; RESPIRATORY (INHALATION) 1
Qty: 1 | Refills: 0 | Status: ACTIVE | COMMUNITY

## 2023-05-02 RX ORDER — MONTELUKAST SODIUM 10 MG/1
1 TABLET TABLET ORAL ONCE A DAY
Status: ACTIVE | COMMUNITY

## 2023-05-02 RX ORDER — PROMETHAZINE HYDROCHLORIDE 12.5 MG/1
1 TABLET AS NEEDED TABLET ORAL EVERY 24 HOURS
Qty: 30 CAPSULE | Refills: 6 | Status: ACTIVE | COMMUNITY

## 2023-05-02 RX ORDER — TORSEMIDE 10 MG/1
1 TABLET TABLET ORAL ONCE A DAY
Status: ACTIVE | COMMUNITY

## 2023-05-02 RX ORDER — IBUPROFEN 200 MG
1 TABLET WITH MEALS CAPSULE ORAL TWICE A DAY
Status: ACTIVE | COMMUNITY

## 2023-05-02 RX ORDER — FLUTICASONE PROPIONATE AND SALMETEROL XINAFOATE 230; 21 UG/1; UG/1
INHALE 2 PUFFS BY INHALATION ROUTE 2 TIMES PER DAY IN THE MORNING AND EVENING AEROSOL, METERED RESPIRATORY (INHALATION) 2
Qty: 1 | Refills: 0 | Status: ACTIVE | COMMUNITY

## 2023-05-02 RX ORDER — BUPROPION HYDROCHLORIDE 150 MG/1
1 TABLET IN THE MORNING TABLET, EXTENDED RELEASE ORAL ONCE A DAY
Status: ACTIVE | COMMUNITY

## 2023-05-02 RX ORDER — PREDNISONE 10 MG/1
TAKE 1 MG/KG BY ORAL ROUTE ONCE DAILY TABLET ORAL 1
Qty: 0 | Refills: 0 | Status: ACTIVE | COMMUNITY

## 2023-05-02 RX ORDER — ROFLUMILAST 500 UG/1
1 TABLET TABLET ORAL ONCE A DAY
Status: ACTIVE | COMMUNITY

## 2023-05-02 RX ORDER — AZITHROMYCIN 250 MG/1
M, W, F TABLET, FILM COATED ORAL
Qty: 0 | Refills: 0 | Status: ACTIVE | COMMUNITY

## 2023-05-02 RX ORDER — ALPRAZOLAM 1 MG
TAKE 1 TABLET (1 MG) BY ORAL ROUTE 3 TIMES PER DAY TABLET ORAL
Qty: 0 | Refills: 0 | Status: ACTIVE | COMMUNITY

## 2023-05-02 RX ORDER — SERTRALINE 100 MG/1
TAKE 1.5 TABLETS (150 MG) BY ORAL ROUTE ONCE DAILY TABLET, FILM COATED ORAL 1
Qty: 0 | Refills: 0 | Status: ACTIVE | COMMUNITY

## 2023-05-02 NOTE — HPI-TODAY'S VISIT:
5/3/2023: Patient returns today for follow-up.  She continues to have abdominal distention and alternating loose stools and constipation.  Breath test negative for SIBO.  Patient is still not submitted stool studies.  She also complains of abdominal pain and bloating and nausea.  Patient does states she has a results of incontinence occasionally as she cannot get to the bathroom in time when she has diarrhea.

## 2023-05-02 NOTE — EXAM-PHYSICAL EXAM
General: Chronically ill-appearing. No acute distress. HEENT: Anicteric sclerae Cardiovascular: Normal heart rate Respiratory: Using supplemental oxygen  Abdomen: Moderate abdominal distention.   Skin: without visible rashes on examined areas. Musculoskeletal: Cast on right forearm.  No gross muscle wasting. Neuro: No gross focal deficits. Alert and oriented. Psych: Appropriate mood and affect.

## 2023-05-04 ENCOUNTER — LAB OUTSIDE AN ENCOUNTER (OUTPATIENT)
Dept: URBAN - METROPOLITAN AREA CLINIC 54 | Facility: CLINIC | Age: 68
End: 2023-05-04

## 2023-05-12 LAB — PANCREATIC ELASTASE, FECAL: 353

## 2023-06-06 ENCOUNTER — OFFICE VISIT (OUTPATIENT)
Dept: URBAN - METROPOLITAN AREA CLINIC 54 | Facility: CLINIC | Age: 68
End: 2023-06-06
Payer: COMMERCIAL

## 2023-06-06 VITALS
HEART RATE: 86 BPM | BODY MASS INDEX: 25.71 KG/M2 | TEMPERATURE: 97.6 F | HEIGHT: 64 IN | WEIGHT: 150.6 LBS | DIASTOLIC BLOOD PRESSURE: 81 MMHG | SYSTOLIC BLOOD PRESSURE: 100 MMHG

## 2023-06-06 DIAGNOSIS — R10.84 ABDOMINAL CRAMPING, GENERALIZED: ICD-10-CM

## 2023-06-06 DIAGNOSIS — R19.7 ACUTE DIARRHEA: ICD-10-CM

## 2023-06-06 PROCEDURE — 99213 OFFICE O/P EST LOW 20 MIN: CPT | Performed by: STUDENT IN AN ORGANIZED HEALTH CARE EDUCATION/TRAINING PROGRAM

## 2023-06-06 RX ORDER — DICYCLOMINE HYDROCHLORIDE 20 MG/1
1 TABLET TABLET ORAL THREE TIMES A DAY
Qty: 90 | OUTPATIENT
Start: 2023-06-06 | End: 2023-07-06

## 2023-06-06 RX ORDER — FLUTICASONE PROPIONATE AND SALMETEROL XINAFOATE 230; 21 UG/1; UG/1
INHALE 2 PUFFS BY INHALATION ROUTE 2 TIMES PER DAY IN THE MORNING AND EVENING AEROSOL, METERED RESPIRATORY (INHALATION) 2
Qty: 1 | Refills: 0 | Status: ACTIVE | COMMUNITY

## 2023-06-06 RX ORDER — ALPRAZOLAM 1 MG
TAKE 1 TABLET (1 MG) BY ORAL ROUTE 3 TIMES PER DAY TABLET ORAL
Qty: 0 | Refills: 0 | Status: ACTIVE | COMMUNITY

## 2023-06-06 RX ORDER — IBUPROFEN 200 MG
1 TABLET WITH MEALS CAPSULE ORAL TWICE A DAY
Status: ACTIVE | COMMUNITY

## 2023-06-06 RX ORDER — BUPROPION HYDROCHLORIDE 150 MG/1
1 TABLET IN THE MORNING TABLET, EXTENDED RELEASE ORAL ONCE A DAY
Status: ACTIVE | COMMUNITY

## 2023-06-06 RX ORDER — SERTRALINE 100 MG/1
TAKE 1.5 TABLETS (150 MG) BY ORAL ROUTE ONCE DAILY TABLET, FILM COATED ORAL 1
Qty: 0 | Refills: 0 | Status: ACTIVE | COMMUNITY

## 2023-06-06 RX ORDER — COLESTIPOL HYDROCHLORIDE 1 G/1
2 TABLETS TABLET, FILM COATED ORAL ONCE A DAY
Qty: 60 | Refills: 1 | OUTPATIENT
Start: 2023-06-06

## 2023-06-06 RX ORDER — PREDNISONE 10 MG/1
TAKE 1 MG/KG BY ORAL ROUTE ONCE DAILY TABLET ORAL 1
Qty: 0 | Refills: 0 | Status: ACTIVE | COMMUNITY

## 2023-06-06 RX ORDER — PROMETHAZINE HYDROCHLORIDE 12.5 MG/1
1 TABLET AS NEEDED TABLET ORAL EVERY 24 HOURS
Qty: 30 CAPSULE | Refills: 6 | Status: ACTIVE | COMMUNITY

## 2023-06-06 RX ORDER — ICOSAPENT ETHYL 1 G/1
2 CAPSULES WITH MEALS CAPSULE ORAL TWICE A DAY
Status: ACTIVE | COMMUNITY

## 2023-06-06 RX ORDER — TORSEMIDE 10 MG/1
1 TABLET TABLET ORAL ONCE A DAY
Status: ACTIVE | COMMUNITY

## 2023-06-06 RX ORDER — TIOTROPIUM BROMIDE 18 UG/1
INHALE 1 CAPSULE (18 MCG) USING 2 INHALATIONS BY INHALATION ROUTE ONCE DAILY VIA HANDIHALER CAPSULE ORAL; RESPIRATORY (INHALATION) 1
Qty: 1 | Refills: 0 | Status: ACTIVE | COMMUNITY

## 2023-06-06 RX ORDER — AZITHROMYCIN 250 MG/1
M, W, F TABLET, FILM COATED ORAL
Qty: 0 | Refills: 0 | Status: ACTIVE | COMMUNITY

## 2023-06-06 RX ORDER — MONTELUKAST SODIUM 10 MG/1
1 TABLET TABLET ORAL ONCE A DAY
Status: ACTIVE | COMMUNITY

## 2023-06-06 RX ORDER — ROFLUMILAST 500 UG/1
1 TABLET TABLET ORAL ONCE A DAY
Status: ACTIVE | COMMUNITY

## 2023-06-06 RX ORDER — MULTIVIT-MIN/IRON/FOLIC ACID/K 18-600-40
AS DIRECTED CAPSULE ORAL
Status: ACTIVE | COMMUNITY

## 2023-06-06 RX ORDER — ONDANSETRON HYDROCHLORIDE 8 MG/1
1 TABLET AS NEEDED TABLET, FILM COATED ORAL ONCE A DAY
Qty: 30 TABLET | Refills: 2 | Status: ACTIVE | COMMUNITY

## 2023-06-06 RX ORDER — LEVOCETIRIZINE DIHYDROCHLORIDE 5 MG/1
1 TABLET IN THE EVENING TABLET, FILM COATED ORAL ONCE A DAY
Status: ACTIVE | COMMUNITY

## 2023-06-06 NOTE — HPI-TODAY'S VISIT:
--6/6/2023--  In the interim, patient's pancreatic elastase test was normal.  Patient's abdominal bloating/discomfort continues.  Patient states that she can have 5-6 stools per day.  Some of the stools will be hard and others will be like paste.  She states the stools are small and difficult to evacuate.  She thinks this is more due to constipation and diarrhea.  She uses Imodium.  She states when she does not use Imodium she has significant amount of bowel movements.  She does endorse fecal incontinence.  This has not improved since she started Citrucel.

## 2023-06-08 ENCOUNTER — TELEPHONE ENCOUNTER (OUTPATIENT)
Dept: URBAN - METROPOLITAN AREA CLINIC 54 | Facility: CLINIC | Age: 68
End: 2023-06-08

## 2023-06-08 RX ORDER — COLESTIPOL HYDROCHLORIDE 1 G/1
2 TABLETS TABLET, FILM COATED ORAL ONCE A DAY
Qty: 180 | Refills: 1
Start: 2023-06-06

## 2023-07-14 ENCOUNTER — OFFICE VISIT (OUTPATIENT)
Dept: URBAN - METROPOLITAN AREA CLINIC 54 | Facility: CLINIC | Age: 68
End: 2023-07-14

## 2023-07-14 RX ORDER — IBUPROFEN 200 MG
1 TABLET WITH MEALS CAPSULE ORAL TWICE A DAY
Status: ACTIVE | COMMUNITY

## 2023-07-14 RX ORDER — MONTELUKAST SODIUM 10 MG/1
1 TABLET TABLET ORAL ONCE A DAY
Status: ACTIVE | COMMUNITY

## 2023-07-14 RX ORDER — ROFLUMILAST 500 UG/1
1 TABLET TABLET ORAL ONCE A DAY
Status: ACTIVE | COMMUNITY

## 2023-07-14 RX ORDER — AZITHROMYCIN 250 MG/1
M, W, F TABLET, FILM COATED ORAL
Qty: 0 | Refills: 0 | Status: ACTIVE | COMMUNITY

## 2023-07-14 RX ORDER — LEVOCETIRIZINE DIHYDROCHLORIDE 5 MG/1
1 TABLET IN THE EVENING TABLET, FILM COATED ORAL ONCE A DAY
Status: ACTIVE | COMMUNITY

## 2023-07-14 RX ORDER — COLESTIPOL HYDROCHLORIDE 1 G/1
2 TABLETS TABLET, FILM COATED ORAL ONCE A DAY
Qty: 180 | Refills: 1 | Status: ACTIVE | COMMUNITY
Start: 2023-06-06

## 2023-07-14 RX ORDER — PREDNISONE 10 MG/1
TAKE 1 MG/KG BY ORAL ROUTE ONCE DAILY TABLET ORAL 1
Qty: 0 | Refills: 0 | Status: ACTIVE | COMMUNITY

## 2023-07-14 RX ORDER — PROMETHAZINE HYDROCHLORIDE 12.5 MG/1
1 TABLET AS NEEDED TABLET ORAL EVERY 24 HOURS
Qty: 30 CAPSULE | Refills: 6 | Status: ACTIVE | COMMUNITY

## 2023-07-14 RX ORDER — FLUTICASONE PROPIONATE AND SALMETEROL XINAFOATE 230; 21 UG/1; UG/1
INHALE 2 PUFFS BY INHALATION ROUTE 2 TIMES PER DAY IN THE MORNING AND EVENING AEROSOL, METERED RESPIRATORY (INHALATION) 2
Qty: 1 | Refills: 0 | Status: ACTIVE | COMMUNITY

## 2023-07-14 RX ORDER — BUPROPION HYDROCHLORIDE 150 MG/1
1 TABLET IN THE MORNING TABLET, EXTENDED RELEASE ORAL ONCE A DAY
Status: ACTIVE | COMMUNITY

## 2023-07-14 RX ORDER — ALPRAZOLAM 1 MG
TAKE 1 TABLET (1 MG) BY ORAL ROUTE 3 TIMES PER DAY TABLET ORAL
Qty: 0 | Refills: 0 | Status: ACTIVE | COMMUNITY

## 2023-07-14 RX ORDER — SERTRALINE 100 MG/1
TAKE 1.5 TABLETS (150 MG) BY ORAL ROUTE ONCE DAILY TABLET, FILM COATED ORAL 1
Qty: 0 | Refills: 0 | Status: ACTIVE | COMMUNITY

## 2023-07-14 RX ORDER — TIOTROPIUM BROMIDE 18 UG/1
INHALE 1 CAPSULE (18 MCG) USING 2 INHALATIONS BY INHALATION ROUTE ONCE DAILY VIA HANDIHALER CAPSULE ORAL; RESPIRATORY (INHALATION) 1
Qty: 1 | Refills: 0 | Status: ACTIVE | COMMUNITY

## 2023-07-14 RX ORDER — ONDANSETRON HYDROCHLORIDE 8 MG/1
1 TABLET AS NEEDED TABLET, FILM COATED ORAL ONCE A DAY
Qty: 30 TABLET | Refills: 2 | Status: ACTIVE | COMMUNITY

## 2023-07-14 RX ORDER — MULTIVIT-MIN/IRON/FOLIC ACID/K 18-600-40
AS DIRECTED CAPSULE ORAL
Status: ACTIVE | COMMUNITY

## 2023-07-14 RX ORDER — FLUTICASONE PROPIONATE 50 UG/1
1 SPRAY IN EACH NOSTRIL SPRAY, METERED NASAL ONCE A DAY
Status: ACTIVE | COMMUNITY

## 2023-07-14 RX ORDER — TORSEMIDE 10 MG/1
1 TABLET TABLET ORAL ONCE A DAY
Status: ACTIVE | COMMUNITY

## 2023-07-14 RX ORDER — ICOSAPENT ETHYL 1 G/1
2 CAPSULES WITH MEALS CAPSULE ORAL TWICE A DAY
Status: ACTIVE | COMMUNITY

## 2023-07-24 ENCOUNTER — TELEPHONE ENCOUNTER (OUTPATIENT)
Dept: URBAN - METROPOLITAN AREA CLINIC 54 | Facility: CLINIC | Age: 68
End: 2023-07-24

## 2023-07-24 RX ORDER — TIOTROPIUM BROMIDE 18 UG/1
INHALE 1 CAPSULE (18 MCG) USING 2 INHALATIONS BY INHALATION ROUTE ONCE DAILY VIA HANDIHALER CAPSULE ORAL; RESPIRATORY (INHALATION) 1
Qty: 1 | Refills: 0 | Status: ACTIVE | COMMUNITY

## 2023-07-24 RX ORDER — AZITHROMYCIN 250 MG/1
M, W, F TABLET, FILM COATED ORAL
Qty: 0 | Refills: 0 | Status: ACTIVE | COMMUNITY

## 2023-07-24 RX ORDER — ICOSAPENT ETHYL 1 G/1
2 CAPSULES WITH MEALS CAPSULE ORAL TWICE A DAY
Status: ACTIVE | COMMUNITY

## 2023-07-24 RX ORDER — BUPROPION HYDROCHLORIDE 150 MG/1
1 TABLET IN THE MORNING TABLET, EXTENDED RELEASE ORAL ONCE A DAY
Status: ACTIVE | COMMUNITY

## 2023-07-24 RX ORDER — TORSEMIDE 10 MG/1
1 TABLET TABLET ORAL ONCE A DAY
Status: ACTIVE | COMMUNITY

## 2023-07-24 RX ORDER — ONDANSETRON HYDROCHLORIDE 8 MG/1
1 TABLET AS NEEDED TABLET, FILM COATED ORAL ONCE A DAY
Qty: 30 TABLET | Refills: 2 | Status: ACTIVE | COMMUNITY

## 2023-07-24 RX ORDER — MULTIVIT-MIN/IRON/FOLIC ACID/K 18-600-40
AS DIRECTED CAPSULE ORAL
Status: ACTIVE | COMMUNITY

## 2023-07-24 RX ORDER — MONTELUKAST SODIUM 10 MG/1
1 TABLET TABLET ORAL ONCE A DAY
Status: ACTIVE | COMMUNITY

## 2023-07-24 RX ORDER — PREDNISONE 10 MG/1
TAKE 1 MG/KG BY ORAL ROUTE ONCE DAILY TABLET ORAL 1
Qty: 0 | Refills: 0 | Status: ACTIVE | COMMUNITY

## 2023-07-24 RX ORDER — LEVOCETIRIZINE DIHYDROCHLORIDE 5 MG/1
1 TABLET IN THE EVENING TABLET, FILM COATED ORAL ONCE A DAY
Status: ACTIVE | COMMUNITY

## 2023-07-24 RX ORDER — SERTRALINE 100 MG/1
TAKE 1.5 TABLETS (150 MG) BY ORAL ROUTE ONCE DAILY TABLET, FILM COATED ORAL 1
Qty: 0 | Refills: 0 | Status: ACTIVE | COMMUNITY

## 2023-07-24 RX ORDER — ALPRAZOLAM 1 MG
TAKE 1 TABLET (1 MG) BY ORAL ROUTE 3 TIMES PER DAY TABLET ORAL
Qty: 0 | Refills: 0 | Status: ACTIVE | COMMUNITY

## 2023-07-24 RX ORDER — ROFLUMILAST 500 UG/1
1 TABLET TABLET ORAL ONCE A DAY
Status: ACTIVE | COMMUNITY

## 2023-07-24 RX ORDER — DICYCLOMINE HYDROCHLORIDE 20 MG/1
1 TABLET TABLET ORAL THREE TIMES A DAY
Qty: 90 | OUTPATIENT
Start: 2023-07-24 | End: 2023-08-23

## 2023-07-24 RX ORDER — FLUTICASONE PROPIONATE AND SALMETEROL XINAFOATE 230; 21 UG/1; UG/1
INHALE 2 PUFFS BY INHALATION ROUTE 2 TIMES PER DAY IN THE MORNING AND EVENING AEROSOL, METERED RESPIRATORY (INHALATION) 2
Qty: 1 | Refills: 0 | Status: ACTIVE | COMMUNITY

## 2023-07-24 RX ORDER — COLESTIPOL HYDROCHLORIDE 1 G/1
2 TABLETS TABLET, FILM COATED ORAL ONCE A DAY
Qty: 180 | Refills: 1 | Status: ACTIVE | COMMUNITY
Start: 2023-06-06

## 2023-07-24 RX ORDER — FLUTICASONE PROPIONATE 50 UG/1
1 SPRAY IN EACH NOSTRIL SPRAY, METERED NASAL ONCE A DAY
Status: ACTIVE | COMMUNITY

## 2023-07-24 RX ORDER — IBUPROFEN 200 MG
1 TABLET WITH MEALS CAPSULE ORAL TWICE A DAY
Status: ACTIVE | COMMUNITY

## 2023-07-24 RX ORDER — PROMETHAZINE HYDROCHLORIDE 12.5 MG/1
1 TABLET AS NEEDED TABLET ORAL EVERY 24 HOURS
Qty: 30 CAPSULE | Refills: 6 | Status: ACTIVE | COMMUNITY

## 2023-08-17 ENCOUNTER — OFFICE VISIT (OUTPATIENT)
Dept: URBAN - METROPOLITAN AREA CLINIC 54 | Facility: CLINIC | Age: 68
End: 2023-08-17
Payer: COMMERCIAL

## 2023-08-17 ENCOUNTER — DASHBOARD ENCOUNTERS (OUTPATIENT)
Age: 68
End: 2023-08-17

## 2023-08-17 VITALS
BODY MASS INDEX: 26.32 KG/M2 | WEIGHT: 154.2 LBS | SYSTOLIC BLOOD PRESSURE: 99 MMHG | DIASTOLIC BLOOD PRESSURE: 63 MMHG | HEART RATE: 78 BPM | HEIGHT: 64 IN | TEMPERATURE: 97.7 F

## 2023-08-17 DIAGNOSIS — R19.8 STRAINING DURING BOWEL MOVEMENTS: ICD-10-CM

## 2023-08-17 PROCEDURE — 99213 OFFICE O/P EST LOW 20 MIN: CPT | Performed by: STUDENT IN AN ORGANIZED HEALTH CARE EDUCATION/TRAINING PROGRAM

## 2023-08-17 RX ORDER — TORSEMIDE 10 MG/1
1 TABLET TABLET ORAL ONCE A DAY
Status: ACTIVE | COMMUNITY

## 2023-08-17 RX ORDER — PREDNISONE 10 MG/1
TAKE 1 MG/KG BY ORAL ROUTE ONCE DAILY TABLET ORAL 1
Qty: 0 | Refills: 0 | Status: ACTIVE | COMMUNITY

## 2023-08-17 RX ORDER — LEVOCETIRIZINE DIHYDROCHLORIDE 5 MG/1
1 TABLET IN THE EVENING TABLET, FILM COATED ORAL ONCE A DAY
Status: ACTIVE | COMMUNITY

## 2023-08-17 RX ORDER — FLUTICASONE PROPIONATE 50 UG/1
1 SPRAY IN EACH NOSTRIL SPRAY, METERED NASAL ONCE A DAY
Status: ACTIVE | COMMUNITY

## 2023-08-17 RX ORDER — ALPRAZOLAM 1 MG
TAKE 1 TABLET (1 MG) BY ORAL ROUTE 3 TIMES PER DAY TABLET ORAL
Qty: 0 | Refills: 0 | Status: ACTIVE | COMMUNITY

## 2023-08-17 RX ORDER — SERTRALINE 100 MG/1
TAKE 1.5 TABLETS (150 MG) BY ORAL ROUTE ONCE DAILY TABLET, FILM COATED ORAL 1
Qty: 0 | Refills: 0 | Status: ACTIVE | COMMUNITY

## 2023-08-17 RX ORDER — DICYCLOMINE HYDROCHLORIDE 20 MG/1
1 TABLET TABLET ORAL THREE TIMES A DAY
Qty: 90 | Status: ACTIVE | COMMUNITY
Start: 2023-07-24 | End: 2023-08-23

## 2023-08-17 RX ORDER — TIOTROPIUM BROMIDE 18 UG/1
INHALE 1 CAPSULE (18 MCG) USING 2 INHALATIONS BY INHALATION ROUTE ONCE DAILY VIA HANDIHALER CAPSULE ORAL; RESPIRATORY (INHALATION) 1
Qty: 1 | Refills: 0 | Status: ACTIVE | COMMUNITY

## 2023-08-17 RX ORDER — AZITHROMYCIN 250 MG/1
M, W, F TABLET, FILM COATED ORAL
Qty: 0 | Refills: 0 | Status: ACTIVE | COMMUNITY

## 2023-08-17 RX ORDER — ONDANSETRON HYDROCHLORIDE 8 MG/1
1 TABLET AS NEEDED TABLET, FILM COATED ORAL ONCE A DAY
Qty: 30 TABLET | Refills: 2 | Status: ACTIVE | COMMUNITY

## 2023-08-17 RX ORDER — FLUTICASONE PROPIONATE AND SALMETEROL XINAFOATE 230; 21 UG/1; UG/1
INHALE 2 PUFFS BY INHALATION ROUTE 2 TIMES PER DAY IN THE MORNING AND EVENING AEROSOL, METERED RESPIRATORY (INHALATION) 2
Qty: 1 | Refills: 0 | Status: ACTIVE | COMMUNITY

## 2023-08-17 RX ORDER — ROFLUMILAST 500 UG/1
1 TABLET TABLET ORAL ONCE A DAY
Status: ACTIVE | COMMUNITY

## 2023-08-17 RX ORDER — IBUPROFEN 200 MG
1 TABLET WITH MEALS CAPSULE ORAL TWICE A DAY
Status: ACTIVE | COMMUNITY

## 2023-08-17 RX ORDER — PROMETHAZINE HYDROCHLORIDE 12.5 MG/1
1 TABLET AS NEEDED TABLET ORAL EVERY 24 HOURS
Qty: 30 CAPSULE | Refills: 6 | Status: ACTIVE | COMMUNITY

## 2023-08-17 RX ORDER — COLESTIPOL HYDROCHLORIDE 1 G/1
2 TABLETS TABLET, FILM COATED ORAL ONCE A DAY
Qty: 180 | Refills: 1 | Status: ACTIVE | COMMUNITY
Start: 2023-06-06

## 2023-08-17 RX ORDER — MONTELUKAST SODIUM 10 MG/1
1 TABLET TABLET ORAL ONCE A DAY
Status: ACTIVE | COMMUNITY

## 2023-08-17 RX ORDER — MULTIVIT-MIN/IRON/FOLIC ACID/K 18-600-40
AS DIRECTED CAPSULE ORAL
Status: ACTIVE | COMMUNITY

## 2023-08-17 RX ORDER — ICOSAPENT ETHYL 1 G/1
2 CAPSULES WITH MEALS CAPSULE ORAL TWICE A DAY
Status: ACTIVE | COMMUNITY

## 2023-08-17 RX ORDER — BUPROPION HYDROCHLORIDE 150 MG/1
1 TABLET IN THE MORNING TABLET, EXTENDED RELEASE ORAL ONCE A DAY
Status: ACTIVE | COMMUNITY

## 2023-08-17 NOTE — HPI-TODAY'S VISIT:
2/23/2023: In the interim, patient symptoms have not improved.  She has yet to have stool studies, get SIBO breath test or start modified FODMAP diet.  She has not done trial of Questran either.  5/3/2023: Patient returns today for follow-up.  She continues to have abdominal distention and alternating loose stools and constipation.  Breath test negative for SIBO.  Patient is still not submitted stool studies.  She also complains of abdominal pain and bloating and nausea.  Patient does states she has a results of incontinence occasionally as she cannot get to the bathroom in time when she has diarrhea.  --8/17/2023--  Patient states that loose stools have resolved with the use of colestipol.  She states that she has difficulty having her first bowel movement of the day, this can take up to 45 minutes.  Subsequently, she will have multiple bowel movements (5 or more) a day.  She endorses sensation of incomplete defecation.  She does states she has some left-sided abdominal pain.  She thinks this may improve after she empties her bowels.  She has not tried IBgard yet as recommended in previous visit.

## 2023-08-17 NOTE — EXAM-PHYSICAL EXAM
General: No acute distress. HEENT: Anicteric sclerae Cardiovascular: Normal heart rate Respiratory: Using supplemental oxygen  Abdomen: Obese pannus.  Mild tenderness to palpation in left lower quadrant. Skin: without visible rashes on examined areas. Musculoskeletal:   No gross muscle wasting. Neuro: No gross focal deficits. Alert and oriented. Psych: Appropriate mood and affect.

## 2023-11-06 NOTE — HPI-OTHER HISTORIES
Pt report that she has debilitating nausea. Wakes up in middle of night with dry heaves. Has whitish foam. Pt reports that she has regurgiation of meds.  Pt reports that her poop is grainy and sometimes dark. Sometimes watery stools and sometimes constipated. Pt reports that her stools are subsequently non stop.  Pt rports that she has to almost immediately go to restroom.  Pt reports that she has no oily or greays stools. C scope with multiple polyps.  7-8-21 Pt reports that she eats and gets a stomach ache. Has mucous in stools. Multiple polyps resectednlast year. Pt reports that she has been experiencing continued bloating. Has hard abdomen. GES and UGI wnl.   1/13/22: Pt is here for follow up. Last cscope was 8/2021 and was significant for multiple polyps. Repeat due in 2024. Pt has straining early in the morning followed by diarrhea for the rest of the day. Varying shapes, sizes, and colors. Pain is having lower abdominal pain, distension, and bloating. This is affecting her quality of life. Pt has not tried Creon. No blood in stool, unintentional weight loss, fever.   Colonoscopy 8/24/21: - Perianal skin tags found on perianal exam. - Severe diverticulosis in the sigmoid colon. There was narrowing of the colon in association with the diverticular opening. Erythema was seen in association with the diverticular opening. Biopsied. - Two 1 to 2 mm polyps at the recto-sigmoid colon, removed with a jumbo cold forceps. Resected and retrieved. - Altered vascular and congested mucosa in the entire examined colon. Biopsied. - Two 3 to 5 mm polyps in the sigmoid colon, removed with a cold snare. Complete resection. Partial retrieval. - One 7 mm polyp in the transverse colon, removed with a cold snare. Resected and retrieved. - The examination was otherwise normal - Biopsy: TA and hyperplastic polyps
No exercise/No heavy lifting

## 2024-06-26 ENCOUNTER — TELEPHONE ENCOUNTER (OUTPATIENT)
Dept: URBAN - METROPOLITAN AREA CLINIC 54 | Facility: CLINIC | Age: 69
End: 2024-06-26

## 2024-06-26 RX ORDER — COLESTIPOL HYDROCHLORIDE 1 G/1
2 TABLETS TABLET, FILM COATED ORAL ONCE A DAY
Qty: 180 | Refills: 0
Start: 2023-06-06

## 2024-07-02 ENCOUNTER — LAB OUTSIDE AN ENCOUNTER (OUTPATIENT)
Dept: URBAN - METROPOLITAN AREA CLINIC 54 | Facility: CLINIC | Age: 69
End: 2024-07-02

## 2024-07-02 ENCOUNTER — OFFICE VISIT (OUTPATIENT)
Dept: URBAN - METROPOLITAN AREA CLINIC 54 | Facility: CLINIC | Age: 69
End: 2024-07-02
Payer: MEDICARE

## 2024-07-02 VITALS
HEART RATE: 90 BPM | HEIGHT: 64 IN | BODY MASS INDEX: 23.05 KG/M2 | SYSTOLIC BLOOD PRESSURE: 145 MMHG | DIASTOLIC BLOOD PRESSURE: 96 MMHG | WEIGHT: 135 LBS | TEMPERATURE: 98.1 F

## 2024-07-02 DIAGNOSIS — K59.1 CHRONIC FUNCTIONAL DIARRHEA: ICD-10-CM

## 2024-07-02 DIAGNOSIS — R11.2 NAUSEA AND VOMITING, UNSPECIFIED VOMITING TYPE: ICD-10-CM

## 2024-07-02 DIAGNOSIS — R10.84 ABDOMINAL PAIN, GENERALIZED: ICD-10-CM

## 2024-07-02 DIAGNOSIS — K92.1 HEMATOCHEZIA: ICD-10-CM

## 2024-07-02 PROBLEM — 428283002: Status: ACTIVE | Noted: 2024-07-02

## 2024-07-02 PROBLEM — 13645005: Status: ACTIVE | Noted: 2024-07-02

## 2024-07-02 PROCEDURE — 99214 OFFICE O/P EST MOD 30 MIN: CPT

## 2024-07-02 RX ORDER — BUPROPION HYDROCHLORIDE 150 MG/1
1 TABLET IN THE MORNING TABLET, EXTENDED RELEASE ORAL ONCE A DAY
Status: ON HOLD | COMMUNITY

## 2024-07-02 RX ORDER — ISOSORBIDE MONONITRATE 30 MG/1
1 TABLET IN THE MORNING TABLET, EXTENDED RELEASE ORAL ONCE A DAY
Status: ACTIVE | COMMUNITY

## 2024-07-02 RX ORDER — ALPRAZOLAM 1 MG
TAKE 1 TABLET (1 MG) BY ORAL ROUTE 3 TIMES PER DAY TABLET ORAL
Qty: 0 | Refills: 0 | Status: ACTIVE | COMMUNITY

## 2024-07-02 RX ORDER — ROFLUMILAST 500 UG/1
1 TABLET TABLET ORAL ONCE A DAY
Status: ACTIVE | COMMUNITY

## 2024-07-02 RX ORDER — PREDNISONE 10 MG/1
TAKE 1 MG/KG BY ORAL ROUTE ONCE DAILY TABLET ORAL 1
Qty: 0 | Refills: 0 | Status: ACTIVE | COMMUNITY

## 2024-07-02 RX ORDER — FLUTICASONE PROPIONATE AND SALMETEROL XINAFOATE 230; 21 UG/1; UG/1
INHALE 2 PUFFS BY INHALATION ROUTE 2 TIMES PER DAY IN THE MORNING AND EVENING AEROSOL, METERED RESPIRATORY (INHALATION) 2
Qty: 1 | Refills: 0 | Status: ACTIVE | COMMUNITY

## 2024-07-02 RX ORDER — ONDANSETRON HYDROCHLORIDE 8 MG/1
1 TABLET AS NEEDED TABLET, FILM COATED ORAL ONCE A DAY
Qty: 30 TABLET | Refills: 2 | Status: ACTIVE | COMMUNITY

## 2024-07-02 RX ORDER — PROMETHAZINE HYDROCHLORIDE 12.5 MG/1
1 TABLET AS NEEDED TABLET ORAL EVERY 24 HOURS
Qty: 30 CAPSULE | Refills: 6 | Status: ON HOLD | COMMUNITY

## 2024-07-02 RX ORDER — MONTELUKAST SODIUM 10 MG/1
1 TABLET TABLET ORAL ONCE A DAY
Status: ACTIVE | COMMUNITY

## 2024-07-02 RX ORDER — ICOSAPENT ETHYL 1 G/1
2 CAPSULES WITH MEALS CAPSULE ORAL TWICE A DAY
Status: ON HOLD | COMMUNITY

## 2024-07-02 RX ORDER — COLESTIPOL HYDROCHLORIDE 1 G/1
2 TABLETS TABLET, FILM COATED ORAL ONCE A DAY
Qty: 180 | Refills: 3
Start: 2023-06-06

## 2024-07-02 RX ORDER — IBUPROFEN 200 MG
1 TABLET WITH MEALS CAPSULE ORAL TWICE A DAY
Status: ACTIVE | COMMUNITY

## 2024-07-02 RX ORDER — FLUTICASONE PROPIONATE 50 UG/1
1 SPRAY IN EACH NOSTRIL SPRAY, METERED NASAL ONCE A DAY
Status: ACTIVE | COMMUNITY

## 2024-07-02 RX ORDER — SODIUM, POTASSIUM,MAG SULFATES 17.5-3.13G
AS DIRECTED SOLUTION, RECONSTITUTED, ORAL ORAL
Qty: 354 ML | Refills: 0 | OUTPATIENT
Start: 2024-07-02 | End: 2024-07-03

## 2024-07-02 RX ORDER — AZITHROMYCIN 250 MG/1
M, W, F TABLET, FILM COATED ORAL
Qty: 0 | Refills: 0 | Status: ACTIVE | COMMUNITY

## 2024-07-02 RX ORDER — COLESTIPOL HYDROCHLORIDE 1 G/1
2 TABLETS TABLET, FILM COATED ORAL ONCE A DAY
Qty: 180 | Refills: 0 | Status: ACTIVE | COMMUNITY
Start: 2023-06-06

## 2024-07-02 RX ORDER — ATORVASTATIN CALCIUM 40 MG/1
1 TABLET TABLET, FILM COATED ORAL ONCE A DAY
Status: ACTIVE | COMMUNITY

## 2024-07-02 RX ORDER — LEVOCETIRIZINE DIHYDROCHLORIDE 5 MG/1
1 TABLET IN THE EVENING TABLET, FILM COATED ORAL ONCE A DAY
Status: ACTIVE | COMMUNITY

## 2024-07-02 RX ORDER — MULTIVIT-MIN/IRON/FOLIC ACID/K 18-600-40
AS DIRECTED CAPSULE ORAL
Status: ACTIVE | COMMUNITY

## 2024-07-02 RX ORDER — SERTRALINE 100 MG/1
TAKE 1.5 TABLETS (150 MG) BY ORAL ROUTE ONCE DAILY TABLET, FILM COATED ORAL 1
Qty: 0 | Refills: 0 | Status: ACTIVE | COMMUNITY

## 2024-07-02 RX ORDER — TORSEMIDE 10 MG/1
1 TABLET TABLET ORAL ONCE A DAY
Status: ON HOLD | COMMUNITY

## 2024-07-02 RX ORDER — TIOTROPIUM BROMIDE 18 UG/1
INHALE 1 CAPSULE (18 MCG) USING 2 INHALATIONS BY INHALATION ROUTE ONCE DAILY VIA HANDIHALER CAPSULE ORAL; RESPIRATORY (INHALATION) 1
Qty: 1 | Refills: 0 | Status: ACTIVE | COMMUNITY

## 2024-07-02 NOTE — HPI-TODAY'S VISIT:
2/23/2023: In the interim, patient symptoms have not improved.  She has yet to have stool studies, get SIBO breath test or start modified FODMAP diet.  She has not done trial of Questran either.  5/3/2023: Patient returns today for follow-up.  She continues to have abdominal distention and alternating loose stools and constipation.  Breath test negative for SIBO.  Patient is still not submitted stool studies.  She also complains of abdominal pain and bloating and nausea.  Patient does states she has a results of incontinence occasionally as she cannot get to the bathroom in time when she has diarrhea.  --8/17/2023--  Patient states that loose stools have resolved with the use of colestipol.  She states that she has difficulty having her first bowel movement of the day, this can take up to 45 minutes.  Subsequently, she will have multiple bowel movements (5 or more) a day.  She endorses sensation of incomplete defecation.  She does states she has some left-sided abdominal pain.  She thinks this may improve after she empties her bowels.  She has not tried IBgard yet as recommended in previous visit.  7/2/24 Follow Up: Pt presents today for 1 year med refill f/u for colestipol with several complaints. Overall pt is very satisfied with colestipol 2 tablets QD for her diarrhea. However between May-June pt reports having severe daily nausea with a few episodes of vomiting. Had significant epigastric and LLQ pain that became generalized. Reports one white/concepcion colored stool. No appetite with 10-20 lbs of unintentional weight loss. Overall most of these symptoms have improved, but pt does show pictures of stool over the last week which are formed but red tinted. No BRBPR in the toilet or on toilet paper, no melena. Last colonoscopy was Aug 2021 with polyps (one 7mm TA, 4 <5mm HPs); 3 year surveillance. On 2L continuous O2 for COPD. No significant cardiac disease. 31.9

## 2024-07-02 NOTE — PHYSICAL EXAM GASTROINTESTINAL
Abdomen , soft, localized LLQ tenderness without rebound, protuberant abdomen, caput medusae, no guarding, no masses palpable

## 2024-07-03 LAB
A/G RATIO: 1.8
ABSOLUTE BASOPHILS: 59
ABSOLUTE EOSINOPHILS: 30
ABSOLUTE LYMPHOCYTES: 2842
ABSOLUTE MONOCYTES: 355
ABSOLUTE NEUTROPHILS: (no result)
ALBUMIN: 4
ALKALINE PHOSPHATASE: 51
ALT (SGPT): 19
AST (SGOT): 16
BASOPHILS: 0.4
BILIRUBIN, TOTAL: 0.3
BUN/CREATININE RATIO: (no result)
BUN: 21
CALCIUM: 10
CARBON DIOXIDE, TOTAL: 28
CHLORIDE: 107
CREATININE: 0.83
EGFR: 76
EOSINOPHILS: 0.2
GLOBULIN, TOTAL: 2.2
GLUCOSE: 106
HEMATOCRIT: 40.6
HEMOGLOBIN: 13.4
LIPASE: 40
LYMPHOCYTES: 19.2
MCH: 29.2
MCHC: 33
MCV: 88.5
MONOCYTES: 2.4
MPV: 10.9
NEUTROPHILS: 77.8
PLATELET COUNT: 266
POTASSIUM: 4.6
PROTEIN, TOTAL: 6.2
RDW: 12.5
RED BLOOD CELL COUNT: 4.59
SODIUM: 145
WHITE BLOOD CELL COUNT: 14.8

## 2024-07-15 ENCOUNTER — LAB OUTSIDE AN ENCOUNTER (OUTPATIENT)
Dept: URBAN - METROPOLITAN AREA CLINIC 54 | Facility: CLINIC | Age: 69
End: 2024-07-15

## 2024-07-15 LAB
CREATININE POC: 1.2
PERFORMING LAB: (no result)

## 2024-07-19 ENCOUNTER — TELEPHONE ENCOUNTER (OUTPATIENT)
Dept: URBAN - METROPOLITAN AREA CLINIC 54 | Facility: CLINIC | Age: 69
End: 2024-07-19

## 2024-07-19 RX ORDER — CIPROFLOXACIN 500 MG/1
1 TABLET TABLET, FILM COATED ORAL
Qty: 20 TABLET | Refills: 0 | OUTPATIENT
Start: 2024-07-21 | End: 2024-07-31

## 2024-07-19 RX ORDER — METRONIDAZOLE 500 MG/1
1 TABLET TABLET ORAL THREE TIMES A DAY
Qty: 30 TABLET | Refills: 0 | OUTPATIENT
Start: 2024-07-21 | End: 2024-07-31

## 2024-09-25 ENCOUNTER — OFFICE VISIT (OUTPATIENT)
Dept: URBAN - METROPOLITAN AREA MEDICAL CENTER 24 | Facility: MEDICAL CENTER | Age: 69
End: 2024-09-25

## 2024-10-11 ENCOUNTER — TELEPHONE ENCOUNTER (OUTPATIENT)
Dept: URBAN - METROPOLITAN AREA CLINIC 54 | Facility: CLINIC | Age: 69
End: 2024-10-11

## 2024-10-14 ENCOUNTER — OFFICE VISIT (OUTPATIENT)
Dept: URBAN - METROPOLITAN AREA CLINIC 54 | Facility: CLINIC | Age: 69
End: 2024-10-14

## 2024-10-28 LAB — OVA AND PARASITES, CONC AND PERM SMEAR: (no result)

## 2025-01-10 ENCOUNTER — OFFICE VISIT (OUTPATIENT)
Dept: URBAN - METROPOLITAN AREA MEDICAL CENTER 24 | Facility: MEDICAL CENTER | Age: 70
End: 2025-01-10

## 2025-01-29 ENCOUNTER — OFFICE VISIT (OUTPATIENT)
Dept: URBAN - METROPOLITAN AREA CLINIC 54 | Facility: CLINIC | Age: 70
End: 2025-01-29